# Patient Record
Sex: FEMALE | Race: WHITE | NOT HISPANIC OR LATINO | Employment: OTHER | RURAL
[De-identification: names, ages, dates, MRNs, and addresses within clinical notes are randomized per-mention and may not be internally consistent; named-entity substitution may affect disease eponyms.]

---

## 2018-02-22 ENCOUNTER — HISTORICAL (OUTPATIENT)
Dept: ADMINISTRATIVE | Facility: HOSPITAL | Age: 78
End: 2018-02-22

## 2018-02-26 LAB
LAB AP GENERAL CAT - HISTORICAL: NORMAL
LAB AP INTERPRETATION/RESULT - HISTORICAL: NEGATIVE
LAB AP SPECIMEN ADEQUACY - HISTORICAL: NORMAL
LAB AP SPECIMEN SUBMITTED - HISTORICAL: NORMAL

## 2020-06-25 ENCOUNTER — HISTORICAL (OUTPATIENT)
Dept: ADMINISTRATIVE | Facility: HOSPITAL | Age: 80
End: 2020-06-25

## 2021-05-11 ENCOUNTER — OFFICE VISIT (OUTPATIENT)
Dept: FAMILY MEDICINE | Facility: CLINIC | Age: 81
End: 2021-05-11
Payer: MEDICARE

## 2021-05-11 ENCOUNTER — APPOINTMENT (OUTPATIENT)
Dept: RADIOLOGY | Facility: CLINIC | Age: 81
End: 2021-05-11
Attending: FAMILY MEDICINE
Payer: MEDICARE

## 2021-05-11 VITALS
WEIGHT: 112 LBS | BODY MASS INDEX: 25.19 KG/M2 | SYSTOLIC BLOOD PRESSURE: 118 MMHG | TEMPERATURE: 98 F | OXYGEN SATURATION: 98 % | HEART RATE: 73 BPM | DIASTOLIC BLOOD PRESSURE: 60 MMHG | HEIGHT: 56 IN

## 2021-05-11 DIAGNOSIS — M79.642 LEFT HAND PAIN: ICD-10-CM

## 2021-05-11 DIAGNOSIS — S63.92XA HAND SPRAIN, LEFT, INITIAL ENCOUNTER: ICD-10-CM

## 2021-05-11 DIAGNOSIS — M79.642 LEFT HAND PAIN: Primary | ICD-10-CM

## 2021-05-11 PROCEDURE — 99203 PR OFFICE/OUTPT VISIT, NEW, LEVL III, 30-44 MIN: ICD-10-PCS | Mod: ,,, | Performed by: FAMILY MEDICINE

## 2021-05-11 PROCEDURE — 73130 X-RAY EXAM OF HAND: CPT | Mod: TC,RHCUB,LT | Performed by: FAMILY MEDICINE

## 2021-05-11 PROCEDURE — 73130 X-RAY EXAM OF HAND: CPT | Mod: 26,LT,, | Performed by: RADIOLOGY

## 2021-05-11 PROCEDURE — 99203 OFFICE O/P NEW LOW 30 MIN: CPT | Mod: ,,, | Performed by: FAMILY MEDICINE

## 2021-05-11 PROCEDURE — 73130 XR HAND COMPLETE 3 VIEW LEFT: ICD-10-PCS | Mod: 26,LT,, | Performed by: RADIOLOGY

## 2021-05-11 RX ORDER — DICYCLOMINE HYDROCHLORIDE 10 MG/1
CAPSULE ORAL
COMMUNITY
Start: 2021-04-01

## 2021-05-11 RX ORDER — DICLOFENAC SODIUM 10 MG/G
GEL TOPICAL
COMMUNITY
Start: 2021-04-01 | End: 2022-07-18

## 2021-05-11 RX ORDER — IPRATROPIUM BROMIDE 17 UG/1
AEROSOL, METERED RESPIRATORY (INHALATION)
COMMUNITY
Start: 2020-08-17 | End: 2022-07-11

## 2021-05-11 RX ORDER — DICLOFENAC SODIUM 10 MG/G
1 GEL TOPICAL
COMMUNITY
Start: 2020-08-10 | End: 2021-08-10

## 2021-05-11 RX ORDER — GABAPENTIN 300 MG/1
300 CAPSULE ORAL
Status: ON HOLD | COMMUNITY
Start: 2020-08-10 | End: 2022-07-18

## 2021-05-11 RX ORDER — DOCUSATE SODIUM 100 MG/1
100 CAPSULE, LIQUID FILLED ORAL
COMMUNITY
Start: 2020-08-17 | End: 2021-08-17

## 2021-05-11 RX ORDER — MELOXICAM 15 MG/1
15 TABLET ORAL
COMMUNITY
Start: 2020-08-10 | End: 2021-08-10

## 2021-05-11 RX ORDER — OMEPRAZOLE 20 MG/1
20 CAPSULE, DELAYED RELEASE ORAL
COMMUNITY
Start: 2020-08-10 | End: 2022-07-11

## 2021-05-11 RX ORDER — TIZANIDINE HYDROCHLORIDE 4 MG/1
TABLET ORAL
COMMUNITY
Start: 2021-04-01

## 2021-05-11 RX ORDER — LEVOTHYROXINE SODIUM 50 UG/1
50 TABLET ORAL
Status: ON HOLD | COMMUNITY
Start: 2020-08-10 | End: 2022-07-18

## 2021-05-11 RX ORDER — PERPHENAZINE AND AMITRIPTYLINE HYDROCHLORIDE 2; 25 MG/1; MG/1
TABLET, FILM COATED ORAL
COMMUNITY
Start: 2021-03-13 | End: 2022-07-18

## 2021-05-11 RX ORDER — ACETAMINOPHEN 325 MG/1
325 TABLET ORAL
COMMUNITY
Start: 2020-08-10 | End: 2021-08-10

## 2021-05-11 RX ORDER — PROMETHAZINE HYDROCHLORIDE 25 MG/1
25 TABLET ORAL
COMMUNITY
Start: 2020-08-10 | End: 2021-08-10

## 2021-06-29 ENCOUNTER — HOSPITAL ENCOUNTER (OUTPATIENT)
Dept: RADIOLOGY | Facility: HOSPITAL | Age: 81
Discharge: HOME | End: 2021-06-29
Payer: MEDICARE

## 2021-06-29 VITALS — BODY MASS INDEX: 23.39 KG/M2 | WEIGHT: 116 LBS | HEIGHT: 59 IN

## 2021-06-29 DIAGNOSIS — Z12.31 VISIT FOR SCREENING MAMMOGRAM: ICD-10-CM

## 2021-06-29 PROCEDURE — 77067 MAMMO DIGITAL SCREENING BILAT: ICD-10-PCS | Mod: 26,,, | Performed by: RADIOLOGY

## 2021-06-29 PROCEDURE — 77067 SCR MAMMO BI INCL CAD: CPT | Mod: 26,,, | Performed by: RADIOLOGY

## 2021-06-29 PROCEDURE — 77067 SCR MAMMO BI INCL CAD: CPT | Mod: TC

## 2021-07-19 ENCOUNTER — HOSPITAL ENCOUNTER (OUTPATIENT)
Dept: RADIOLOGY | Facility: HOSPITAL | Age: 81
Discharge: HOME | End: 2021-07-19
Attending: ORTHOPAEDIC SURGERY
Payer: MEDICARE

## 2021-07-19 DIAGNOSIS — M79.642 HAND PAIN, LEFT: ICD-10-CM

## 2021-07-19 PROBLEM — M19.042 ARTHRITIS OF LEFT HAND: Status: ACTIVE | Noted: 2021-07-19

## 2021-07-19 PROCEDURE — 73130 X-RAY EXAM OF HAND: CPT | Mod: TC,LT

## 2022-07-11 ENCOUNTER — OFFICE VISIT (OUTPATIENT)
Dept: FAMILY MEDICINE | Facility: CLINIC | Age: 82
End: 2022-07-11
Payer: MEDICARE

## 2022-07-11 ENCOUNTER — APPOINTMENT (OUTPATIENT)
Dept: RADIOLOGY | Facility: CLINIC | Age: 82
End: 2022-07-11
Attending: NURSE PRACTITIONER
Payer: MEDICARE

## 2022-07-11 VITALS
RESPIRATION RATE: 16 BRPM | BODY MASS INDEX: 23.39 KG/M2 | HEART RATE: 66 BPM | DIASTOLIC BLOOD PRESSURE: 58 MMHG | OXYGEN SATURATION: 97 % | TEMPERATURE: 99 F | WEIGHT: 116 LBS | HEIGHT: 59 IN | SYSTOLIC BLOOD PRESSURE: 112 MMHG
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
## 2022-07-11 DIAGNOSIS — M25.551 RIGHT HIP PAIN: ICD-10-CM

## 2022-07-11 DIAGNOSIS — M25.551 RIGHT HIP PAIN: Primary | ICD-10-CM

## 2022-07-11 PROCEDURE — 96372 PR INJECTION,THERAP/PROPH/DIAG2ST, IM OR SUBCUT: ICD-10-PCS | Mod: ,,, | Performed by: NURSE PRACTITIONER

## 2022-07-11 PROCEDURE — 99213 OFFICE O/P EST LOW 20 MIN: CPT | Mod: ,,, | Performed by: NURSE PRACTITIONER

## 2022-07-11 PROCEDURE — 73502 XR PELVIS 3 VIEW INC HIP 2 VIEW RIGHT: ICD-10-PCS | Mod: 26,RT,, | Performed by: STUDENT IN AN ORGANIZED HEALTH CARE EDUCATION/TRAINING PROGRAM

## 2022-07-11 PROCEDURE — 96372 THER/PROPH/DIAG INJ SC/IM: CPT | Mod: ,,, | Performed by: NURSE PRACTITIONER

## 2022-07-11 PROCEDURE — 73502 X-RAY EXAM HIP UNI 2-3 VIEWS: CPT | Mod: 26,RT,, | Performed by: STUDENT IN AN ORGANIZED HEALTH CARE EDUCATION/TRAINING PROGRAM

## 2022-07-11 PROCEDURE — 73502 X-RAY EXAM HIP UNI 2-3 VIEWS: CPT | Mod: TC,RHCUB,RT | Performed by: NURSE PRACTITIONER

## 2022-07-11 PROCEDURE — 99213 PR OFFICE/OUTPT VISIT, EST, LEVL III, 20-29 MIN: ICD-10-PCS | Mod: ,,, | Performed by: NURSE PRACTITIONER

## 2022-07-11 RX ORDER — ACETAMINOPHEN 325 MG/1
TABLET ORAL
COMMUNITY
Start: 2022-05-03

## 2022-07-11 RX ORDER — DOCUSATE SODIUM 100 MG/1
CAPSULE, LIQUID FILLED ORAL
COMMUNITY
Start: 2022-01-26

## 2022-07-11 RX ORDER — MULTIVITAMIN
1 TABLET ORAL DAILY PRN
COMMUNITY
Start: 2022-02-01

## 2022-07-11 RX ORDER — MULTIVITAMIN
1 TABLET ORAL DAILY
COMMUNITY
Start: 2022-05-03

## 2022-07-11 RX ORDER — ASPIRIN 81 MG/1
81 TABLET ORAL DAILY
COMMUNITY
Start: 2022-05-03 | End: 2022-07-18

## 2022-07-11 RX ORDER — MELOXICAM 15 MG/1
15 TABLET ORAL DAILY
Status: ON HOLD | COMMUNITY
Start: 2022-05-03 | End: 2022-07-18 | Stop reason: HOSPADM

## 2022-07-11 RX ORDER — KETOROLAC TROMETHAMINE 30 MG/ML
30 INJECTION, SOLUTION INTRAMUSCULAR; INTRAVENOUS
Status: COMPLETED | OUTPATIENT
Start: 2022-07-11 | End: 2022-07-11

## 2022-07-11 RX ORDER — PROMETHAZINE HYDROCHLORIDE 25 MG/1
TABLET ORAL
COMMUNITY
Start: 2022-05-03

## 2022-07-11 RX ADMIN — KETOROLAC TROMETHAMINE 30 MG: 30 INJECTION, SOLUTION INTRAMUSCULAR; INTRAVENOUS at 04:07

## 2022-07-11 NOTE — PATIENT INSTRUCTIONS
-Continue current medications as prescribed.  -Ice to right hip as needed for pain.  -Follow up with PCP if pain does not improve or worsens.

## 2022-07-11 NOTE — PROGRESS NOTES
[facility] Medicine    Chief Complaint      Chief Complaint   Patient presents with    Hip Pain     Right hip; fell Saturday evening at home. Pt states painful to walk     History of Present Illness      Anibal Fermin is a 82 y.o. female with chronic conditions of arthritis who presents today for c/o right hip pain after falling at home 2 days ago.    Hip Pain   The incident occurred 3 to 5 days ago. The incident occurred at home. The injury mechanism was a fall. The pain is present in the right hip and right thigh. The pain is moderate. The pain has been constant since onset. Associated symptoms include an inability to bear weight and a loss of motion. Pertinent negatives include no loss of sensation, muscle weakness, numbness or tingling. She reports no foreign bodies present. The symptoms are aggravated by weight bearing and movement. She has tried non-weight bearing for the symptoms. The treatment provided mild relief.     Past Medical History:  No past medical history on file.    Past Surgical History:   has a past surgical history that includes Hysterectomy.    Social History:  Social History     Tobacco Use    Smoking status: Never Smoker    Smokeless tobacco: Never Used   Substance Use Topics    Alcohol use: Never    Drug use: Never     I personally reviewed all past medical, surgical, and social.     Review of Systems   Constitutional: Negative for chills, fever and malaise/fatigue.   HENT: Negative for congestion, ear pain, hearing loss, sore throat and tinnitus.    Eyes: Negative for blurred vision and double vision.   Respiratory: Negative for cough and shortness of breath.    Cardiovascular: Negative for chest pain, palpitations and leg swelling.   Gastrointestinal: Negative for abdominal pain, nausea and vomiting.   Genitourinary: Negative for dysuria, frequency and urgency.   Musculoskeletal: Positive for falls and joint pain (right hip). Negative for myalgias.   Skin: Negative for itching and  "rash.   Neurological: Negative for dizziness, tingling, weakness, numbness and headaches.   Endo/Heme/Allergies: Does not bruise/bleed easily.   Psychiatric/Behavioral: Negative for suicidal ideas.      Medications:  Outpatient Encounter Medications as of 7/11/2022   Medication Sig Dispense Refill    acetaminophen (TYLENOL) 325 MG tablet Take by mouth.      aspirin (ECOTRIN) 81 MG EC tablet Take 81 mg by mouth once daily.      calcium-vitamin D 600 mg-10 mcg (400 unit) Tab Take 1 tablet by mouth daily as needed.      dicyclomine (BENTYL) 10 MG capsule       docusate sodium (COLACE) 100 MG capsule Take by mouth.      gabapentin (NEURONTIN) 300 MG capsule Take 300 mg by mouth.      ipratropium (ATROVENT HFA) 17 mcg/actuation inhaler Inhale into the lungs.      levothyroxine (SYNTHROID) 50 MCG tablet Take 50 mcg by mouth.      meloxicam (MOBIC) 15 MG tablet Take 15 mg by mouth once daily.      multivitamin (THERAGRAN) per tablet Take 1 tablet by mouth once daily.      omeprazole (PRILOSEC) 20 MG capsule Take 20 mg by mouth.      perphenazine-amitriptyline 2-25 mg 2-25 mg Tab       promethazine (PHENERGAN) 25 MG tablet Take by mouth.      VOLTAREN 1 % Gel       ZANAFLEX 4 mg tablet        Facility-Administered Encounter Medications as of 7/11/2022   Medication Dose Route Frequency Provider Last Rate Last Admin    ketorolac injection 30 mg  30 mg Intramuscular 1 time in Clinic/HOD QUAN [place]         Allergies:  Review of patient's allergies indicates:  No Known Allergies    Health Maintenance:    There is no immunization history on file for this patient.   Health Maintenance   Topic Date Due    Lipid Panel  Never done    TETANUS VACCINE  Never done    DEXA Scan  Never done      Physical Exam      Vital Signs  Temp: 98.5 °F (36.9 °C)  Pulse: 66  Resp: 16  SpO2: 97 %  BP: (!) 112/58  Pain Score:   6  Height and Weight  Height: 4' 11" (149.9 cm)  Weight: 52.6 kg (116 lb)  BSA (Calculated - sq " m): 1.48 sq meters  BMI (Calculated): 23.4  Weight in (lb) to have BMI = 25: 123.5]    Physical Exam  Vitals and nursing note reviewed.   Constitutional:       General: She is not in acute distress.     Appearance: Normal appearance.   HENT:      Head: Normocephalic and atraumatic.      Right Ear: External ear normal.      Left Ear: External ear normal.      Nose: Nose normal.      Mouth/Throat:      Mouth: Mucous membranes are moist.      Pharynx: Oropharynx is clear.   Eyes:      Conjunctiva/sclera: Conjunctivae normal.      Pupils: Pupils are equal, round, and reactive to light.   Cardiovascular:      Rate and Rhythm: Normal rate and regular rhythm.      Pulses: Normal pulses.      Heart sounds: Normal heart sounds. No murmur heard.  Pulmonary:      Effort: Pulmonary effort is normal. No respiratory distress.      Breath sounds: Normal breath sounds.   Musculoskeletal:      Cervical back: Normal range of motion and neck supple.      Right hip: Tenderness present. Decreased range of motion.   Skin:     General: Skin is warm and dry.   Neurological:      General: No focal deficit present.      Mental Status: She is alert and oriented to person, place, and time. Mental status is at baseline.   Psychiatric:         Mood and Affect: Mood normal.         Behavior: Behavior normal.         Thought Content: Thought content normal.         Judgment: Judgment normal.        Assessment/Plan     Anibal Fermin is a 82 y.o.female with:    1. Right hip pain  - X-Ray Pelvis 3 view inc Hip 2 view Right; Future  - ketorolac injection 30 mg    2. Fall, initial encounter  - X-Ray Pelvis 3 view inc Hip 2 view Right; Future     Chronic conditions status updated as per HPI.  Other than changes above, cont current medications and maintain follow up with specialists.  Return to clinic as needed.    Hilary Cunningham, KATYAP  [facility]

## 2022-07-15 ENCOUNTER — HOSPITAL ENCOUNTER (INPATIENT)
Facility: HOSPITAL | Age: 82
LOS: 3 days | Discharge: NURSING, REHAB OR LONG-TERM CARE | DRG: 482 | End: 2022-07-18
Attending: FAMILY MEDICINE | Admitting: FAMILY MEDICINE
Payer: MEDICARE

## 2022-07-15 DIAGNOSIS — M19.042 ARTHRITIS OF LEFT HAND: ICD-10-CM

## 2022-07-15 DIAGNOSIS — R07.9 CHEST PAIN: ICD-10-CM

## 2022-07-15 DIAGNOSIS — Z01.818 PREOP EXAMINATION: Primary | ICD-10-CM

## 2022-07-15 DIAGNOSIS — E03.9 HYPOTHYROIDISM, UNSPECIFIED TYPE: ICD-10-CM

## 2022-07-15 DIAGNOSIS — M19.90 OSTEOARTHRITIS, UNSPECIFIED OSTEOARTHRITIS TYPE, UNSPECIFIED SITE: ICD-10-CM

## 2022-07-15 DIAGNOSIS — S72.91XA CLOSED FRACTURE OF RIGHT FEMUR, UNSPECIFIED FRACTURE MORPHOLOGY, UNSPECIFIED PORTION OF FEMUR, INITIAL ENCOUNTER: ICD-10-CM

## 2022-07-15 DIAGNOSIS — G62.9 NEUROPATHY: ICD-10-CM

## 2022-07-15 DIAGNOSIS — S72.001A CLOSED FRACTURE OF NECK OF RIGHT FEMUR, INITIAL ENCOUNTER: ICD-10-CM

## 2022-07-15 DIAGNOSIS — K21.9 GASTROESOPHAGEAL REFLUX DISEASE, UNSPECIFIED WHETHER ESOPHAGITIS PRESENT: ICD-10-CM

## 2022-07-15 PROBLEM — S72.90XA FRACTURE OF FEMUR: Status: ACTIVE | Noted: 2022-07-15

## 2022-07-15 LAB
ANION GAP SERPL CALCULATED.3IONS-SCNC: 10 MMOL/L (ref 7–16)
APTT PPP: 30.2 SECONDS (ref 25.2–37.3)
BASOPHILS # BLD AUTO: 0.02 K/UL (ref 0–0.2)
BASOPHILS NFR BLD AUTO: 0.3 % (ref 0–1)
BUN SERPL-MCNC: 21 MG/DL (ref 7–18)
BUN/CREAT SERPL: 21 (ref 6–20)
CALCIUM SERPL-MCNC: 8.6 MG/DL (ref 8.5–10.1)
CHLORIDE SERPL-SCNC: 105 MMOL/L (ref 98–107)
CO2 SERPL-SCNC: 28 MMOL/L (ref 21–32)
CREAT SERPL-MCNC: 1.01 MG/DL (ref 0.55–1.02)
DIFFERENTIAL METHOD BLD: ABNORMAL
EOSINOPHIL # BLD AUTO: 0.13 K/UL (ref 0–0.5)
EOSINOPHIL NFR BLD AUTO: 1.9 % (ref 1–4)
ERYTHROCYTE [DISTWIDTH] IN BLOOD BY AUTOMATED COUNT: 13.4 % (ref 11.5–14.5)
FLUAV AG UPPER RESP QL IA.RAPID: NEGATIVE
FLUBV AG UPPER RESP QL IA.RAPID: NEGATIVE
GLUCOSE SERPL-MCNC: 101 MG/DL (ref 74–106)
HCT VFR BLD AUTO: 36.3 % (ref 38–47)
HGB BLD-MCNC: 11.9 G/DL (ref 12–16)
IMM GRANULOCYTES # BLD AUTO: 0.01 K/UL (ref 0–0.04)
IMM GRANULOCYTES NFR BLD: 0.1 % (ref 0–0.4)
INDIRECT COOMBS: NORMAL
INR BLD: 1.01
LYMPHOCYTES # BLD AUTO: 1.31 K/UL (ref 1–4.8)
LYMPHOCYTES NFR BLD AUTO: 19 % (ref 27–41)
MCH RBC QN AUTO: 29.7 PG (ref 27–31)
MCHC RBC AUTO-ENTMCNC: 32.8 G/DL (ref 32–36)
MCV RBC AUTO: 90.5 FL (ref 80–96)
MONOCYTES # BLD AUTO: 0.45 K/UL (ref 0–0.8)
MONOCYTES NFR BLD AUTO: 6.5 % (ref 2–6)
MPC BLD CALC-MCNC: 8.4 FL (ref 9.4–12.4)
NEUTROPHILS # BLD AUTO: 4.96 K/UL (ref 1.8–7.7)
NEUTROPHILS NFR BLD AUTO: 72.2 % (ref 53–65)
NRBC # BLD AUTO: 0 X10E3/UL
NRBC, AUTO (.00): 0 %
PLATELET # BLD AUTO: 289 K/UL (ref 150–400)
POTASSIUM SERPL-SCNC: 4.1 MMOL/L (ref 3.5–5.1)
PROTHROMBIN TIME: 12.9 SECONDS (ref 11.7–14.7)
RBC # BLD AUTO: 4.01 M/UL (ref 4.2–5.4)
RH BLD: NORMAL
SARS-COV+SARS-COV-2 AG RESP QL IA.RAPID: NEGATIVE
SODIUM SERPL-SCNC: 139 MMOL/L (ref 136–145)
WBC # BLD AUTO: 6.88 K/UL (ref 4.5–11)

## 2022-07-15 PROCEDURE — 99223 1ST HOSP IP/OBS HIGH 75: CPT | Mod: AI,,, | Performed by: FAMILY MEDICINE

## 2022-07-15 PROCEDURE — 36415 COLL VENOUS BLD VENIPUNCTURE: CPT | Performed by: NURSE PRACTITIONER

## 2022-07-15 PROCEDURE — 25000003 PHARM REV CODE 250: Performed by: HOSPITALIST

## 2022-07-15 PROCEDURE — 25000003 PHARM REV CODE 250: Performed by: FAMILY MEDICINE

## 2022-07-15 PROCEDURE — 25000003 PHARM REV CODE 250: Performed by: NURSE PRACTITIONER

## 2022-07-15 PROCEDURE — 63600175 PHARM REV CODE 636 W HCPCS: Performed by: NURSE PRACTITIONER

## 2022-07-15 PROCEDURE — 99285 PR EMERGENCY DEPT VISIT,LEVEL V: ICD-10-PCS | Mod: ,,, | Performed by: NURSE PRACTITIONER

## 2022-07-15 PROCEDURE — 96375 TX/PRO/DX INJ NEW DRUG ADDON: CPT

## 2022-07-15 PROCEDURE — 99285 EMERGENCY DEPT VISIT HI MDM: CPT | Mod: 25

## 2022-07-15 PROCEDURE — 99285 EMERGENCY DEPT VISIT HI MDM: CPT | Mod: ,,, | Performed by: NURSE PRACTITIONER

## 2022-07-15 PROCEDURE — 96374 THER/PROPH/DIAG INJ IV PUSH: CPT

## 2022-07-15 PROCEDURE — 86901 BLOOD TYPING SEROLOGIC RH(D): CPT | Performed by: NURSE PRACTITIONER

## 2022-07-15 PROCEDURE — 11000001 HC ACUTE MED/SURG PRIVATE ROOM

## 2022-07-15 PROCEDURE — 99223 PR INITIAL HOSPITAL CARE,LEVL III: ICD-10-PCS | Mod: AI,,, | Performed by: FAMILY MEDICINE

## 2022-07-15 PROCEDURE — 80048 BASIC METABOLIC PNL TOTAL CA: CPT | Performed by: NURSE PRACTITIONER

## 2022-07-15 PROCEDURE — 51702 INSERT TEMP BLADDER CATH: CPT

## 2022-07-15 PROCEDURE — 85025 COMPLETE CBC W/AUTO DIFF WBC: CPT | Performed by: NURSE PRACTITIONER

## 2022-07-15 PROCEDURE — 85610 PROTHROMBIN TIME: CPT | Performed by: NURSE PRACTITIONER

## 2022-07-15 PROCEDURE — 87428 SARSCOV & INF VIR A&B AG IA: CPT | Performed by: FAMILY MEDICINE

## 2022-07-15 PROCEDURE — 85730 THROMBOPLASTIN TIME PARTIAL: CPT | Performed by: NURSE PRACTITIONER

## 2022-07-15 RX ORDER — ACETAMINOPHEN 325 MG/1
650 TABLET ORAL EVERY 4 HOURS PRN
Status: DISCONTINUED | OUTPATIENT
Start: 2022-07-15 | End: 2022-07-18 | Stop reason: HOSPADM

## 2022-07-15 RX ORDER — MORPHINE SULFATE 4 MG/ML
4 INJECTION, SOLUTION INTRAMUSCULAR; INTRAVENOUS EVERY 4 HOURS PRN
Status: DISCONTINUED | OUTPATIENT
Start: 2022-07-15 | End: 2022-07-18 | Stop reason: HOSPADM

## 2022-07-15 RX ORDER — ONDANSETRON 2 MG/ML
4 INJECTION INTRAMUSCULAR; INTRAVENOUS
Status: COMPLETED | OUTPATIENT
Start: 2022-07-15 | End: 2022-07-15

## 2022-07-15 RX ORDER — POLYETHYLENE GLYCOL 3350 17 G/17G
17 POWDER, FOR SOLUTION ORAL DAILY
Status: DISCONTINUED | OUTPATIENT
Start: 2022-07-16 | End: 2022-07-18 | Stop reason: HOSPADM

## 2022-07-15 RX ORDER — DEXTROSE MONOHYDRATE AND SODIUM CHLORIDE 5; .9 G/100ML; G/100ML
INJECTION, SOLUTION INTRAVENOUS CONTINUOUS
Status: DISCONTINUED | OUTPATIENT
Start: 2022-07-15 | End: 2022-07-18 | Stop reason: HOSPADM

## 2022-07-15 RX ORDER — MORPHINE SULFATE 4 MG/ML
4 INJECTION, SOLUTION INTRAMUSCULAR; INTRAVENOUS
Status: COMPLETED | OUTPATIENT
Start: 2022-07-15 | End: 2022-07-15

## 2022-07-15 RX ORDER — DOCUSATE SODIUM 100 MG/1
100 CAPSULE, LIQUID FILLED ORAL DAILY
Status: DISCONTINUED | OUTPATIENT
Start: 2022-07-16 | End: 2022-07-18 | Stop reason: HOSPADM

## 2022-07-15 RX ORDER — NALOXONE HCL 0.4 MG/ML
0.02 VIAL (ML) INJECTION
Status: DISCONTINUED | OUTPATIENT
Start: 2022-07-15 | End: 2022-07-18 | Stop reason: HOSPADM

## 2022-07-15 RX ORDER — SODIUM CHLORIDE 0.9 % (FLUSH) 0.9 %
10 SYRINGE (ML) INJECTION EVERY 12 HOURS PRN
Status: DISCONTINUED | OUTPATIENT
Start: 2022-07-15 | End: 2022-07-18 | Stop reason: HOSPADM

## 2022-07-15 RX ORDER — MAG HYDROX/ALUMINUM HYD/SIMETH 200-200-20
30 SUSPENSION, ORAL (FINAL DOSE FORM) ORAL 4 TIMES DAILY PRN
Status: DISCONTINUED | OUTPATIENT
Start: 2022-07-15 | End: 2022-07-18 | Stop reason: HOSPADM

## 2022-07-15 RX ORDER — ONDANSETRON 2 MG/ML
4 INJECTION INTRAMUSCULAR; INTRAVENOUS EVERY 8 HOURS PRN
Status: DISCONTINUED | OUTPATIENT
Start: 2022-07-15 | End: 2022-07-18 | Stop reason: HOSPADM

## 2022-07-15 RX ORDER — MUPIROCIN 20 MG/G
OINTMENT TOPICAL 2 TIMES DAILY
Status: DISCONTINUED | OUTPATIENT
Start: 2022-07-15 | End: 2022-07-18 | Stop reason: HOSPADM

## 2022-07-15 RX ORDER — TALC
6 POWDER (GRAM) TOPICAL NIGHTLY PRN
Status: DISCONTINUED | OUTPATIENT
Start: 2022-07-15 | End: 2022-07-18 | Stop reason: HOSPADM

## 2022-07-15 RX ORDER — GABAPENTIN 300 MG/1
300 CAPSULE ORAL 2 TIMES DAILY
Status: DISCONTINUED | OUTPATIENT
Start: 2022-07-15 | End: 2022-07-18 | Stop reason: HOSPADM

## 2022-07-15 RX ORDER — HYDROCODONE BITARTRATE AND ACETAMINOPHEN 5; 325 MG/1; MG/1
1 TABLET ORAL EVERY 6 HOURS PRN
Status: DISCONTINUED | OUTPATIENT
Start: 2022-07-15 | End: 2022-07-18 | Stop reason: HOSPADM

## 2022-07-15 RX ORDER — TIZANIDINE 4 MG/1
4 TABLET ORAL EVERY 8 HOURS PRN
Status: DISCONTINUED | OUTPATIENT
Start: 2022-07-15 | End: 2022-07-18 | Stop reason: HOSPADM

## 2022-07-15 RX ORDER — PANTOPRAZOLE SODIUM 40 MG/1
40 TABLET, DELAYED RELEASE ORAL DAILY
Status: DISCONTINUED | OUTPATIENT
Start: 2022-07-16 | End: 2022-07-18 | Stop reason: HOSPADM

## 2022-07-15 RX ORDER — GLUCAGON 1 MG
1 KIT INJECTION
Status: DISCONTINUED | OUTPATIENT
Start: 2022-07-15 | End: 2022-07-18 | Stop reason: HOSPADM

## 2022-07-15 RX ORDER — LEVOTHYROXINE SODIUM 50 UG/1
50 TABLET ORAL
Status: DISCONTINUED | OUTPATIENT
Start: 2022-07-16 | End: 2022-07-18 | Stop reason: HOSPADM

## 2022-07-15 RX ORDER — SIMETHICONE 80 MG
1 TABLET,CHEWABLE ORAL 4 TIMES DAILY PRN
Status: DISCONTINUED | OUTPATIENT
Start: 2022-07-15 | End: 2022-07-18 | Stop reason: HOSPADM

## 2022-07-15 RX ADMIN — ONDANSETRON 4 MG: 2 INJECTION INTRAMUSCULAR; INTRAVENOUS at 04:07

## 2022-07-15 RX ADMIN — MUPIROCIN: 20 OINTMENT TOPICAL at 10:07

## 2022-07-15 RX ADMIN — ONDANSETRON 4 MG: 2 INJECTION INTRAMUSCULAR; INTRAVENOUS at 10:07

## 2022-07-15 RX ADMIN — HYDROCODONE BITARTRATE AND ACETAMINOPHEN 1 TABLET: 5; 325 TABLET ORAL at 10:07

## 2022-07-15 RX ADMIN — DEXTROSE AND SODIUM CHLORIDE: 5; 900 INJECTION, SOLUTION INTRAVENOUS at 06:07

## 2022-07-15 RX ADMIN — TIZANIDINE 4 MG: 4 TABLET ORAL at 10:07

## 2022-07-15 RX ADMIN — GABAPENTIN 300 MG: 300 CAPSULE ORAL at 10:07

## 2022-07-15 RX ADMIN — MORPHINE SULFATE 4 MG: 4 INJECTION INTRAVENOUS at 04:07

## 2022-07-15 RX ADMIN — SIMETHICONE 80 MG: 80 TABLET, CHEWABLE ORAL at 10:07

## 2022-07-15 RX ADMIN — MELATONIN 6 MG: at 10:07

## 2022-07-15 NOTE — PROVIDER PROGRESS NOTES - EMERGENCY DEPT.
Encounter Date: 7/15/2022    ED Physician Progress Notes        Discussed with ; will admit to medicine

## 2022-07-15 NOTE — ASSESSMENT & PLAN NOTE
- acute  - comminuted impacted fracture of the right-sided femoral neck  - Dimitry consulted   - NPO p MN   - D5NS IVF  - EKG and CXR without acute processes   - continue home med- holding ASA

## 2022-07-15 NOTE — ED PROVIDER NOTES
Encounter Date: 7/15/2022       History     Chief Complaint   Patient presents with    Hip Pain     82 year old female presents to the emergency department to be evaluated for right hip pain. She fell and injured her right hip 1 week ago. She had an xray right of her right hip 4 days ago that showed no acute abnormality. She has continued to have right hip pain that has gradually gotten worse since she fell a week ago.    The history is provided by the patient.   Hip Pain  This is a new problem. Pertinent negatives include no chest pain, no abdominal pain, no headaches and no shortness of breath.     Review of patient's allergies indicates:  No Known Allergies  History reviewed. No pertinent past medical history.  Past Surgical History:   Procedure Laterality Date    HYSTERECTOMY       History reviewed. No pertinent family history.  Social History     Tobacco Use    Smoking status: Never Smoker    Smokeless tobacco: Never Used   Substance Use Topics    Alcohol use: Never    Drug use: Never     Review of Systems   Respiratory: Negative for shortness of breath.    Cardiovascular: Negative for chest pain.   Gastrointestinal: Negative for abdominal pain.   Neurological: Negative for headaches.   All other systems reviewed and are negative.      Physical Exam     Initial Vitals [07/15/22 1415]   BP Pulse Resp Temp SpO2   (!) 149/50 70 18 97.8 °F (36.6 °C) 98 %      MAP       --         Physical Exam    Vitals reviewed.  Constitutional: She appears well-developed and well-nourished.   HENT:   Head: Normocephalic and atraumatic.   Neck: Neck supple.   Cardiovascular: Normal rate and regular rhythm.   Pulmonary/Chest: Breath sounds normal.   Abdominal: Abdomen is soft. Bowel sounds are normal. She exhibits no distension and no mass. There is no abdominal tenderness. There is no rebound and no guarding.   Musculoskeletal:      Cervical back: Normal and neck supple.      Thoracic back: Normal.      Lumbar back: Normal.       Right hip: Tenderness and bony tenderness present. No deformity, lacerations or crepitus. Decreased range of motion. Normal strength.      Right knee: Normal.      Left knee: Normal.      Right lower leg: Normal.      Left lower leg: Normal.     Neurological: She is alert and oriented to person, place, and time. She has normal strength. GCS score is 15. GCS eye subscore is 4. GCS verbal subscore is 5. GCS motor subscore is 6.   Skin: Skin is warm and dry. Capillary refill takes less than 2 seconds.   Psychiatric: She has a normal mood and affect.         Medical Screening Exam   See Full Note    ED Course   Procedures  Labs Reviewed   CBC W/ AUTO DIFFERENTIAL    Narrative:     The following orders were created for panel order CBC auto differential.  Procedure                               Abnormality         Status                     ---------                               -----------         ------                     CBC with Differential[[id number]]                                                         Please view results for these tests on the individual orders.   BASIC METABOLIC PANEL   PROTIME-INR   APTT   CBC WITH DIFFERENTIAL   TYPE & SCREEN          Imaging Results          X-Ray Chest 1 View (In process)                CT Hip Without Contrast Right (In process)                  Medications - No data to display                    Clinical Impression:   Final diagnoses:  [Z01.818] Preop examination  [Z01.818] Preop examination                 Cathleen Dodson, ROWENA  07/25/22 9213

## 2022-07-15 NOTE — ED TRIAGE NOTES
Right hip pain after fall last Saturday, pt reports being seen in clinic on Monday and being told there was no fracture. Pt reports having Dr Sequeira appt on Wednesday.

## 2022-07-15 NOTE — HPI
Ms Fermin is an 81 yo female who presented to the ED with c/o R leg/hip pain that started after a fall a week ago. She was seen at an outside clinic and had imaging done that did not show a fracture. She was sent home. She has continued to have problems walking and continued pain. Imaging in the ED today revealed a comminuted impacted fracture of the right-sided femoral neck. She is being admitted to the hospitalist service with consult to orthopedics.     She has a PMH of hypothyroidism, osteoarthritis, neuropathy, and COPD. She denies any ETOH or smoking. She lives at home. She has not been vaccinated for covid; she has not had covid. She is considered a full code.

## 2022-07-15 NOTE — SUBJECTIVE & OBJECTIVE
Past Medical History:   Diagnosis Date    Arthritis     COPD (chronic obstructive pulmonary disease)     Digestive disorder     Thyroid disease        Past Surgical History:   Procedure Laterality Date    ABDOMINAL SURGERY      HYSTERECTOMY         Review of patient's allergies indicates:  No Known Allergies    No current facility-administered medications on file prior to encounter.     Current Outpatient Medications on File Prior to Encounter   Medication Sig    acetaminophen (TYLENOL) 325 MG tablet Take by mouth.    aspirin (ECOTRIN) 81 MG EC tablet Take 81 mg by mouth once daily.    calcium-vitamin D 600 mg-10 mcg (400 unit) Tab Take 1 tablet by mouth daily as needed.    dicyclomine (BENTYL) 10 MG capsule     docusate sodium (COLACE) 100 MG capsule Take by mouth.    gabapentin (NEURONTIN) 300 MG capsule Take 300 mg by mouth.    ipratropium (ATROVENT HFA) 17 mcg/actuation inhaler Inhale into the lungs.    levothyroxine (SYNTHROID) 50 MCG tablet Take 50 mcg by mouth.    meloxicam (MOBIC) 15 MG tablet Take 15 mg by mouth once daily.    multivitamin (THERAGRAN) per tablet Take 1 tablet by mouth once daily.    omeprazole (PRILOSEC) 20 MG capsule Take 20 mg by mouth.    perphenazine-amitriptyline 2-25 mg 2-25 mg Tab     promethazine (PHENERGAN) 25 MG tablet Take by mouth.    VOLTAREN 1 % Gel     ZANAFLEX 4 mg tablet      Family History    None       Tobacco Use    Smoking status: Never Smoker    Smokeless tobacco: Never Used   Substance and Sexual Activity    Alcohol use: Never    Drug use: Never    Sexual activity: Not on file     Review of Systems   Constitutional:  Positive for activity change. Negative for chills and fever.   HENT:  Negative for congestion and postnasal drip.    Eyes:  Negative for photophobia and visual disturbance.   Respiratory:  Negative for cough and shortness of breath.    Cardiovascular:  Negative for chest pain.   Gastrointestinal:  Negative for abdominal distention, diarrhea and nausea.    Endocrine: Negative for cold intolerance and heat intolerance.   Genitourinary:  Negative for difficulty urinating and dysuria.   Musculoskeletal:  Positive for gait problem and myalgias.   Skin:  Negative for color change and pallor.   Allergic/Immunologic: Negative for environmental allergies and food allergies.   Neurological:  Negative for dizziness, weakness and numbness.   Psychiatric/Behavioral:  Negative for agitation. The patient is not nervous/anxious.    All other systems reviewed and are negative.  Objective:     Vital Signs (Most Recent):  Temp: 97.8 °F (36.6 °C) (07/15/22 1415)  Pulse: 70 (07/15/22 1415)  Resp: 16 (07/15/22 1643)  BP: (!) 149/50 (07/15/22 1415)  SpO2: 98 % (07/15/22 1415)   Vital Signs (24h Range):  Temp:  [97.8 °F (36.6 °C)] 97.8 °F (36.6 °C)  Pulse:  [70] 70  Resp:  [16-18] 16  SpO2:  [98 %] 98 %  BP: (149)/(50) 149/50        Body mass index is 23.43 kg/m².    Physical Exam  Vitals and nursing note reviewed.   Constitutional:       Appearance: Normal appearance.   HENT:      Head: Normocephalic.      Mouth/Throat:      Mouth: Mucous membranes are moist.   Eyes:      Pupils: Pupils are equal, round, and reactive to light.   Cardiovascular:      Rate and Rhythm: Normal rate and regular rhythm.      Pulses: Normal pulses.      Heart sounds: Normal heart sounds.   Pulmonary:      Effort: Pulmonary effort is normal.      Breath sounds: Normal breath sounds.   Abdominal:      General: Bowel sounds are normal. There is no distension.      Palpations: Abdomen is soft.      Tenderness: There is no abdominal tenderness.   Musculoskeletal:         General: Normal range of motion.      Cervical back: Neck supple.        Legs:       Comments: R hipwith tenderness      Skin:     General: Skin is warm and dry.      Capillary Refill: Capillary refill takes less than 2 seconds.   Neurological:      Mental Status: She is alert and oriented to person, place, and time. Mental status is at baseline.       GCS: GCS eye subscore is 4. GCS verbal subscore is 5. GCS motor subscore is 6.      Sensory: Sensation is intact.      Motor: Motor function is intact.   Psychiatric:         Mood and Affect: Mood normal.         Behavior: Behavior normal.         CRANIAL NERVES     CN III, IV, VI   Pupils are equal, round, and reactive to light.     Significant Labs: All pertinent labs within the past 24 hours have been reviewed.  Recent Lab Results         07/15/22  1606   07/15/22  1600        Anion Gap   10       aPTT   30.2       Baso #   0.02       Basophil %   0.3       BUN   21       BUN/CREAT RATIO   21       Calcium   8.6       Chloride   105       CO2   28       Creatinine   1.01       Differential Type   Auto       eGFR if non    56       Eos #   0.13       Eosinophil %   1.9       Glucose   101       Group & Rh O POS         Hematocrit   36.3       Hemoglobin   11.9       Immature Grans (Abs)   0.01       Immature Granulocytes   0.1       INDIRECT MERYL NEG         INR   1.01       Lymph #   1.31       Lymph %   19.0       MCH   29.7       MCHC   32.8       MCV   90.5       Mono #   0.45       Mono %   6.5       MPV   8.4       Neutrophils, Abs   4.96       Neutrophils Relative   72.2       nRBC   0.0       NUCLEATED RBC ABSOLUTE   0.00       Platelets   289       Potassium   4.1       Protime   12.9       RBC   4.01       RDW   13.4       Sodium   139       WBC   6.88               Significant Imaging: I have reviewed all pertinent imaging results/findings within the past 24 hours.  Imaging Results              X-Ray Chest 1 View (Final result)  Result time 07/15/22 16:00:33      Final result by Ayo Kasper II, MD (07/15/22 16:00:33)                   Impression:      Chronic lung changes.  No acute process or significant change.      Electronically signed by: Ayo Kasper  Date:    07/15/2022  Time:    16:00               Narrative:    EXAMINATION:  XR CHEST 1 VIEW    CLINICAL  HISTORY:  Encounter for other preprocedural examination    COMPARISON:  29 May 2015    FINDINGS:  The heart and mediastinum are normal in size and configuration.  The pulmonary vascularity is normal in caliber. Lung volumes are increased with prominent bronchial markings.  No lung infiltrates, effusions, pneumothorax or other abnormality is demonstrated.                                       CT Hip Without Contrast Right (Final result)  Result time 07/15/22 15:53:56      Final result by Krishan Caballero DO (07/15/22 15:53:56)                   Impression:      Acute mildly comminuted impacted fracture of the right-sided femoral neck. No joint dislocation.      Electronically signed by: Krishan Caballero  Date:    07/15/2022  Time:    15:53               Narrative:    EXAMINATION:  CT HIP WITHOUT CONTRAST RIGHT    CLINICAL HISTORY:  hip pain;    TECHNIQUE:  CT HIP WITHOUT CONTRAST RIGHT    COMPARISON:  07/11/2022    FINDINGS:  Acute mildly comminuted impacted fracture of the right-sided femoral neck.  No joint dislocation.    No hematoma.  No fluid collection.    The visualized pelvis is normal without hematoma or retroperitoneal blood products.

## 2022-07-15 NOTE — H&P
[facility] - Emergency Department  Hospital Medicine  History & Physical    Patient Name: Anibal Fermin  MRN: [id number]  Patient Class: IP- Inpatient  Admission Date: 7/15/2022  Attending Physician: Toribio Baker MD   Primary Care Provider: Brandan Brandon DO (Inactive)         Patient information was obtained from patient, past medical records and ER records.     Subjective:     Principal Problem:Fracture of femur    Chief Complaint:   Chief Complaint   Patient presents with    Hip Pain        HPI: Ms Fermin is an 81 yo female who presented to the ED with c/o R leg/hip pain that started after a fall a week ago. She was seen at an outside clinic and had imaging done that did not show a fracture. She was sent home. She has continued to have problems walking and continued pain. Imaging in the ED today revealed a comminuted impacted fracture of the right-sided femoral neck. She is being admitted to the hospitalist service with consult to orthopedics.     She has a PMH of hypothyroidism, osteoarthritis, neuropathy, and COPD. She denies any ETOH or smoking. She lives at home. She has not been vaccinated for covid; she has not had covid. She is considered a full code.       Past Medical History:   Diagnosis Date    Arthritis     COPD (chronic obstructive pulmonary disease)     Digestive disorder     Thyroid disease        Past Surgical History:   Procedure Laterality Date    ABDOMINAL SURGERY      HYSTERECTOMY         Review of patient's allergies indicates:  No Known Allergies    No current facility-administered medications on file prior to encounter.     Current Outpatient Medications on File Prior to Encounter   Medication Sig    acetaminophen (TYLENOL) 325 MG tablet Take by mouth.    aspirin (ECOTRIN) 81 MG EC tablet Take 81 mg by mouth once daily.    calcium-vitamin D 600 mg-10 mcg (400 unit) Tab Take 1 tablet by mouth daily as needed.    dicyclomine (BENTYL) 10 MG capsule     docusate sodium  (COLACE) 100 MG capsule Take by mouth.    gabapentin (NEURONTIN) 300 MG capsule Take 300 mg by mouth.    ipratropium (ATROVENT HFA) 17 mcg/actuation inhaler Inhale into the lungs.    levothyroxine (SYNTHROID) 50 MCG tablet Take 50 mcg by mouth.    meloxicam (MOBIC) 15 MG tablet Take 15 mg by mouth once daily.    multivitamin (THERAGRAN) per tablet Take 1 tablet by mouth once daily.    omeprazole (PRILOSEC) 20 MG capsule Take 20 mg by mouth.    perphenazine-amitriptyline 2-25 mg 2-25 mg Tab     promethazine (PHENERGAN) 25 MG tablet Take by mouth.    VOLTAREN 1 % Gel     ZANAFLEX 4 mg tablet      Family History    None       Tobacco Use    Smoking status: Never Smoker    Smokeless tobacco: Never Used   Substance and Sexual Activity    Alcohol use: Never    Drug use: Never    Sexual activity: Not on file     Review of Systems   Constitutional:  Positive for activity change. Negative for chills and fever.   HENT:  Negative for congestion and postnasal drip.    Eyes:  Negative for photophobia and visual disturbance.   Respiratory:  Negative for cough and shortness of breath.    Cardiovascular:  Negative for chest pain.   Gastrointestinal:  Negative for abdominal distention, diarrhea and nausea.   Endocrine: Negative for cold intolerance and heat intolerance.   Genitourinary:  Negative for difficulty urinating and dysuria.   Musculoskeletal:  Positive for gait problem and myalgias.   Skin:  Negative for color change and pallor.   Allergic/Immunologic: Negative for environmental allergies and food allergies.   Neurological:  Negative for dizziness, weakness and numbness.   Psychiatric/Behavioral:  Negative for agitation. The patient is not nervous/anxious.    All other systems reviewed and are negative.  Objective:     Vital Signs (Most Recent):  Temp: 97.8 °F (36.6 °C) (07/15/22 1415)  Pulse: 70 (07/15/22 1415)  Resp: 16 (07/15/22 1643)  BP: (!) 149/50 (07/15/22 1415)  SpO2: 98 % (07/15/22 1415)   Vital  Signs (24h Range):  Temp:  [97.8 °F (36.6 °C)] 97.8 °F (36.6 °C)  Pulse:  [70] 70  Resp:  [16-18] 16  SpO2:  [98 %] 98 %  BP: (149)/(50) 149/50        Body mass index is 23.43 kg/m².    Physical Exam  Vitals and nursing note reviewed.   Constitutional:       Appearance: Normal appearance.   HENT:      Head: Normocephalic.      Mouth/Throat:      Mouth: Mucous membranes are moist.   Eyes:      Pupils: Pupils are equal, round, and reactive to light.   Cardiovascular:      Rate and Rhythm: Normal rate and regular rhythm.      Pulses: Normal pulses.      Heart sounds: Normal heart sounds.   Pulmonary:      Effort: Pulmonary effort is normal.      Breath sounds: Normal breath sounds.   Abdominal:      General: Bowel sounds are normal. There is no distension.      Palpations: Abdomen is soft.      Tenderness: There is no abdominal tenderness.   Musculoskeletal:         General: Normal range of motion.      Cervical back: Neck supple.        Legs:       Comments: R hipwith tenderness      Skin:     General: Skin is warm and dry.      Capillary Refill: Capillary refill takes less than 2 seconds.   Neurological:      Mental Status: She is alert and oriented to person, place, and time. Mental status is at baseline.      GCS: GCS eye subscore is 4. GCS verbal subscore is 5. GCS motor subscore is 6.      Sensory: Sensation is intact.      Motor: Motor function is intact.   Psychiatric:         Mood and Affect: Mood normal.         Behavior: Behavior normal.         CRANIAL NERVES     CN III, IV, VI   Pupils are equal, round, and reactive to light.     Significant Labs: All pertinent labs within the past 24 hours have been reviewed.  Recent Lab Results         07/15/22  1606   07/15/22  1600        Anion Gap   10       aPTT   30.2       Baso #   0.02       Basophil %   0.3       BUN   21       BUN/CREAT RATIO   21       Calcium   8.6       Chloride   105       CO2   28       Creatinine   1.01       Differential Type   Auto        eGFR if non    56       Eos #   0.13       Eosinophil %   1.9       Glucose   101       Group & Rh O POS         Hematocrit   36.3       Hemoglobin   11.9       Immature Grans (Abs)   0.01       Immature Granulocytes   0.1       INDIRECT MERYL NEG         INR   1.01       Lymph #   1.31       Lymph %   19.0       MCH   29.7       MCHC   32.8       MCV   90.5       Mono #   0.45       Mono %   6.5       MPV   8.4       Neutrophils, Abs   4.96       Neutrophils Relative   72.2       nRBC   0.0       NUCLEATED RBC ABSOLUTE   0.00       Platelets   289       Potassium   4.1       Protime   12.9       RBC   4.01       RDW   13.4       Sodium   139       WBC   6.88               Significant Imaging: I have reviewed all pertinent imaging results/findings within the past 24 hours.  Imaging Results              X-Ray Chest 1 View (Final result)  Result time 07/15/22 16:00:33      Final result by Ayo Kasper II, MD (07/15/22 16:00:33)                   Impression:      Chronic lung changes.  No acute process or significant change.      Electronically signed by: Ayo Kasper  Date:    07/15/2022  Time:    16:00               Narrative:    EXAMINATION:  XR CHEST 1 VIEW    CLINICAL HISTORY:  Encounter for other preprocedural examination    COMPARISON:  29 May 2015    FINDINGS:  The heart and mediastinum are normal in size and configuration.  The pulmonary vascularity is normal in caliber. Lung volumes are increased with prominent bronchial markings.  No lung infiltrates, effusions, pneumothorax or other abnormality is demonstrated.                                       CT Hip Without Contrast Right (Final result)  Result time 07/15/22 15:53:56      Final result by Krishan Caballero DO (07/15/22 15:53:56)                   Impression:      Acute mildly comminuted impacted fracture of the right-sided femoral neck. No joint dislocation.      Electronically signed by: Krishan  Federico  Date:    07/15/2022  Time:    15:53               Narrative:    EXAMINATION:  CT HIP WITHOUT CONTRAST RIGHT    CLINICAL HISTORY:  hip pain;    TECHNIQUE:  CT HIP WITHOUT CONTRAST RIGHT    COMPARISON:  07/11/2022    FINDINGS:  Acute mildly comminuted impacted fracture of the right-sided femoral neck.  No joint dislocation.    No hematoma.  No fluid collection.    The visualized pelvis is normal without hematoma or retroperitoneal blood products.                                        Assessment/Plan:     * Fracture of femur  - acute  - comminuted impacted fracture of the right-sided femoral neck  - Moraes consulted   - NPO p MN   - D5NS IVF  - EKG and CXR without acute processes   - continue home med- holding ASA        Neuropathy  - chronic, stable  - continue gabapentin       Hypothyroidism  - chronic, stable  - continue levothyroxine       GERD (gastroesophageal reflux disease)  - chronic, stable  - continue PPI       VTE Risk Mitigation (From admission, onward)         Ordered     IP VTE HIGH RISK PATIENT  Once         07/15/22 1732     Place sequential compression device  Until discontinued         07/15/22 1732                   QUAN Malcolm  Department of Hospital Medicine   [facility] [place] - Emergency Department

## 2022-07-16 ENCOUNTER — ANESTHESIA (OUTPATIENT)
Dept: SURGERY | Facility: HOSPITAL | Age: 82
DRG: 482 | End: 2022-07-16
Payer: MEDICARE

## 2022-07-16 ENCOUNTER — ANESTHESIA EVENT (OUTPATIENT)
Dept: SURGERY | Facility: HOSPITAL | Age: 82
DRG: 482 | End: 2022-07-16
Payer: MEDICARE

## 2022-07-16 LAB
ANION GAP SERPL CALCULATED.3IONS-SCNC: 13 MMOL/L (ref 7–16)
ANION GAP SERPL CALCULATED.3IONS-SCNC: 8 MMOL/L (ref 7–16)
BASOPHILS # BLD AUTO: 0.02 K/UL (ref 0–0.2)
BASOPHILS # BLD AUTO: 0.02 K/UL (ref 0–0.2)
BASOPHILS NFR BLD AUTO: 0.3 % (ref 0–1)
BASOPHILS NFR BLD AUTO: 0.5 % (ref 0–1)
BUN SERPL-MCNC: 20 MG/DL (ref 7–18)
BUN SERPL-MCNC: 20 MG/DL (ref 7–18)
BUN/CREAT SERPL: 26 (ref 6–20)
BUN/CREAT SERPL: 27 (ref 6–20)
CALCIUM SERPL-MCNC: 7.9 MG/DL (ref 8.5–10.1)
CALCIUM SERPL-MCNC: 8.3 MG/DL (ref 8.5–10.1)
CHLORIDE SERPL-SCNC: 107 MMOL/L (ref 98–107)
CHLORIDE SERPL-SCNC: 107 MMOL/L (ref 98–107)
CO2 SERPL-SCNC: 24 MMOL/L (ref 21–32)
CO2 SERPL-SCNC: 28 MMOL/L (ref 21–32)
CREAT SERPL-MCNC: 0.74 MG/DL (ref 0.55–1.02)
CREAT SERPL-MCNC: 0.76 MG/DL (ref 0.55–1.02)
DIFFERENTIAL METHOD BLD: ABNORMAL
DIFFERENTIAL METHOD BLD: ABNORMAL
EOSINOPHIL # BLD AUTO: 0.21 K/UL (ref 0–0.5)
EOSINOPHIL # BLD AUTO: 0.22 K/UL (ref 0–0.5)
EOSINOPHIL NFR BLD AUTO: 3.7 % (ref 1–4)
EOSINOPHIL NFR BLD AUTO: 5.3 % (ref 1–4)
ERYTHROCYTE [DISTWIDTH] IN BLOOD BY AUTOMATED COUNT: 13.5 % (ref 11.5–14.5)
ERYTHROCYTE [DISTWIDTH] IN BLOOD BY AUTOMATED COUNT: 13.7 % (ref 11.5–14.5)
GLUCOSE SERPL-MCNC: 105 MG/DL (ref 74–106)
GLUCOSE SERPL-MCNC: 125 MG/DL (ref 74–106)
HCT VFR BLD AUTO: 33 % (ref 38–47)
HCT VFR BLD AUTO: 33.8 % (ref 38–47)
HGB BLD-MCNC: 10.8 G/DL (ref 12–16)
HGB BLD-MCNC: 10.9 G/DL (ref 12–16)
IMM GRANULOCYTES # BLD AUTO: 0.01 K/UL (ref 0–0.04)
IMM GRANULOCYTES # BLD AUTO: 0.02 K/UL (ref 0–0.04)
IMM GRANULOCYTES NFR BLD: 0.2 % (ref 0–0.4)
IMM GRANULOCYTES NFR BLD: 0.3 % (ref 0–0.4)
LYMPHOCYTES # BLD AUTO: 1.19 K/UL (ref 1–4.8)
LYMPHOCYTES # BLD AUTO: 1.36 K/UL (ref 1–4.8)
LYMPHOCYTES NFR BLD AUTO: 20.7 % (ref 27–41)
LYMPHOCYTES NFR BLD AUTO: 32.5 % (ref 27–41)
MAGNESIUM SERPL-MCNC: 2 MG/DL (ref 1.7–2.3)
MCH RBC QN AUTO: 29.9 PG (ref 27–31)
MCH RBC QN AUTO: 29.9 PG (ref 27–31)
MCHC RBC AUTO-ENTMCNC: 32 G/DL (ref 32–36)
MCHC RBC AUTO-ENTMCNC: 33 G/DL (ref 32–36)
MCV RBC AUTO: 90.7 FL (ref 80–96)
MCV RBC AUTO: 93.6 FL (ref 80–96)
MONOCYTES # BLD AUTO: 0.47 K/UL (ref 0–0.8)
MONOCYTES # BLD AUTO: 0.49 K/UL (ref 0–0.8)
MONOCYTES NFR BLD AUTO: 11.7 % (ref 2–6)
MONOCYTES NFR BLD AUTO: 8.2 % (ref 2–6)
MPC BLD CALC-MCNC: 8.5 FL (ref 9.4–12.4)
MPC BLD CALC-MCNC: 8.7 FL (ref 9.4–12.4)
NEUTROPHILS # BLD AUTO: 2.08 K/UL (ref 1.8–7.7)
NEUTROPHILS # BLD AUTO: 3.83 K/UL (ref 1.8–7.7)
NEUTROPHILS NFR BLD AUTO: 49.8 % (ref 53–65)
NEUTROPHILS NFR BLD AUTO: 66.8 % (ref 53–65)
NRBC # BLD AUTO: 0 X10E3/UL
NRBC # BLD AUTO: 0 X10E3/UL
NRBC, AUTO (.00): 0 %
NRBC, AUTO (.00): 0 %
PLATELET # BLD AUTO: 232 K/UL (ref 150–400)
PLATELET # BLD AUTO: 269 K/UL (ref 150–400)
POTASSIUM SERPL-SCNC: 3.7 MMOL/L (ref 3.5–5.1)
POTASSIUM SERPL-SCNC: 4.3 MMOL/L (ref 3.5–5.1)
RBC # BLD AUTO: 3.61 M/UL (ref 4.2–5.4)
RBC # BLD AUTO: 3.64 M/UL (ref 4.2–5.4)
SODIUM SERPL-SCNC: 139 MMOL/L (ref 136–145)
SODIUM SERPL-SCNC: 140 MMOL/L (ref 136–145)
TSH SERPL DL<=0.005 MIU/L-ACNC: 3.06 UIU/ML (ref 0.36–3.74)
WBC # BLD AUTO: 4.18 K/UL (ref 4.5–11)
WBC # BLD AUTO: 5.74 K/UL (ref 4.5–11)

## 2022-07-16 PROCEDURE — 27000221 HC OXYGEN, UP TO 24 HOURS

## 2022-07-16 PROCEDURE — 99232 SBSQ HOSP IP/OBS MODERATE 35: CPT | Mod: ,,, | Performed by: FAMILY MEDICINE

## 2022-07-16 PROCEDURE — C1769 GUIDE WIRE: HCPCS | Performed by: ORTHOPAEDIC SURGERY

## 2022-07-16 PROCEDURE — 80048 BASIC METABOLIC PNL TOTAL CA: CPT | Performed by: NURSE PRACTITIONER

## 2022-07-16 PROCEDURE — 25000003 PHARM REV CODE 250: Performed by: NURSE PRACTITIONER

## 2022-07-16 PROCEDURE — 37000008 HC ANESTHESIA 1ST 15 MINUTES: Performed by: ORTHOPAEDIC SURGERY

## 2022-07-16 PROCEDURE — 63600175 PHARM REV CODE 636 W HCPCS: Performed by: ORTHOPAEDIC SURGERY

## 2022-07-16 PROCEDURE — 99900035 HC TECH TIME PER 15 MIN (STAT)

## 2022-07-16 PROCEDURE — 11000001 HC ACUTE MED/SURG PRIVATE ROOM

## 2022-07-16 PROCEDURE — 83735 ASSAY OF MAGNESIUM: CPT | Performed by: NURSE PRACTITIONER

## 2022-07-16 PROCEDURE — 99232 PR SUBSEQUENT HOSPITAL CARE,LEVL II: ICD-10-PCS | Mod: ,,, | Performed by: FAMILY MEDICINE

## 2022-07-16 PROCEDURE — 36000711: Performed by: ORTHOPAEDIC SURGERY

## 2022-07-16 PROCEDURE — 27201423 OPTIME MED/SURG SUP & DEVICES STERILE SUPPLY: Performed by: ORTHOPAEDIC SURGERY

## 2022-07-16 PROCEDURE — 25000003 PHARM REV CODE 250: Performed by: NURSE ANESTHETIST, CERTIFIED REGISTERED

## 2022-07-16 PROCEDURE — 27000689 HC BLADE LARYNGOSCOPE ANY SIZE: Performed by: ANESTHESIOLOGY

## 2022-07-16 PROCEDURE — 36000710: Performed by: ORTHOPAEDIC SURGERY

## 2022-07-16 PROCEDURE — 25000003 PHARM REV CODE 250: Performed by: ORTHOPAEDIC SURGERY

## 2022-07-16 PROCEDURE — D9220A PRA ANESTHESIA: ICD-10-PCS | Mod: CRNA,,, | Performed by: NURSE ANESTHETIST, CERTIFIED REGISTERED

## 2022-07-16 PROCEDURE — 80048 BASIC METABOLIC PNL TOTAL CA: CPT | Performed by: ORTHOPAEDIC SURGERY

## 2022-07-16 PROCEDURE — 84443 ASSAY THYROID STIM HORMONE: CPT | Performed by: NURSE PRACTITIONER

## 2022-07-16 PROCEDURE — 63600175 PHARM REV CODE 636 W HCPCS: Performed by: NURSE PRACTITIONER

## 2022-07-16 PROCEDURE — 36415 COLL VENOUS BLD VENIPUNCTURE: CPT | Performed by: ORTHOPAEDIC SURGERY

## 2022-07-16 PROCEDURE — 85025 COMPLETE CBC W/AUTO DIFF WBC: CPT | Performed by: ORTHOPAEDIC SURGERY

## 2022-07-16 PROCEDURE — 27000510 HC BLANKET BAIR HUGGER ANY SIZE: Performed by: ANESTHESIOLOGY

## 2022-07-16 PROCEDURE — D9220A PRA ANESTHESIA: Mod: ANES,,, | Performed by: ANESTHESIOLOGY

## 2022-07-16 PROCEDURE — D9220A PRA ANESTHESIA: ICD-10-PCS | Mod: ANES,,, | Performed by: ANESTHESIOLOGY

## 2022-07-16 PROCEDURE — 27000716 HC OXISENSOR PROBE, ANY SIZE: Performed by: ANESTHESIOLOGY

## 2022-07-16 PROCEDURE — 85025 COMPLETE CBC W/AUTO DIFF WBC: CPT | Performed by: NURSE PRACTITIONER

## 2022-07-16 PROCEDURE — 94761 N-INVAS EAR/PLS OXIMETRY MLT: CPT

## 2022-07-16 PROCEDURE — 27000655: Performed by: ANESTHESIOLOGY

## 2022-07-16 PROCEDURE — 37000009 HC ANESTHESIA EA ADD 15 MINS: Performed by: ORTHOPAEDIC SURGERY

## 2022-07-16 PROCEDURE — 25000003 PHARM REV CODE 250: Performed by: HOSPITALIST

## 2022-07-16 PROCEDURE — 36415 COLL VENOUS BLD VENIPUNCTURE: CPT | Performed by: NURSE PRACTITIONER

## 2022-07-16 PROCEDURE — 63600175 PHARM REV CODE 636 W HCPCS: Performed by: NURSE ANESTHETIST, CERTIFIED REGISTERED

## 2022-07-16 PROCEDURE — 27000165 HC TUBE, ETT CUFFED: Performed by: ANESTHESIOLOGY

## 2022-07-16 PROCEDURE — C1713 ANCHOR/SCREW BN/BN,TIS/BN: HCPCS | Performed by: ORTHOPAEDIC SURGERY

## 2022-07-16 PROCEDURE — D9220A PRA ANESTHESIA: Mod: CRNA,,, | Performed by: NURSE ANESTHETIST, CERTIFIED REGISTERED

## 2022-07-16 PROCEDURE — 71000033 HC RECOVERY, INTIAL HOUR: Performed by: ORTHOPAEDIC SURGERY

## 2022-07-16 DEVICE — IMPLANTABLE DEVICE: Type: IMPLANTABLE DEVICE | Site: HIP | Status: FUNCTIONAL

## 2022-07-16 RX ORDER — ROCURONIUM BROMIDE 10 MG/ML
INJECTION, SOLUTION INTRAVENOUS
Status: DISCONTINUED | OUTPATIENT
Start: 2022-07-16 | End: 2022-07-16

## 2022-07-16 RX ORDER — HYDROMORPHONE HYDROCHLORIDE 2 MG/ML
0.5 INJECTION, SOLUTION INTRAMUSCULAR; INTRAVENOUS; SUBCUTANEOUS EVERY 5 MIN PRN
Status: DISCONTINUED | OUTPATIENT
Start: 2022-07-16 | End: 2022-07-18 | Stop reason: HOSPADM

## 2022-07-16 RX ORDER — ONDANSETRON 2 MG/ML
4 INJECTION INTRAMUSCULAR; INTRAVENOUS DAILY PRN
Status: DISCONTINUED | OUTPATIENT
Start: 2022-07-16 | End: 2022-07-18 | Stop reason: HOSPADM

## 2022-07-16 RX ORDER — MUPIROCIN 20 MG/G
1 OINTMENT TOPICAL 2 TIMES DAILY
Status: DISCONTINUED | OUTPATIENT
Start: 2022-07-16 | End: 2022-07-18 | Stop reason: HOSPADM

## 2022-07-16 RX ORDER — MEPERIDINE HYDROCHLORIDE 25 MG/ML
25 INJECTION INTRAMUSCULAR; INTRAVENOUS; SUBCUTANEOUS EVERY 10 MIN PRN
Status: ACTIVE | OUTPATIENT
Start: 2022-07-16 | End: 2022-07-16

## 2022-07-16 RX ORDER — ONDANSETRON 2 MG/ML
4 INJECTION INTRAMUSCULAR; INTRAVENOUS EVERY 8 HOURS PRN
Status: DISCONTINUED | OUTPATIENT
Start: 2022-07-16 | End: 2022-07-18 | Stop reason: HOSPADM

## 2022-07-16 RX ORDER — MORPHINE SULFATE 4 MG/ML
4 INJECTION, SOLUTION INTRAMUSCULAR; INTRAVENOUS EVERY 4 HOURS PRN
Status: DISCONTINUED | OUTPATIENT
Start: 2022-07-16 | End: 2022-07-18 | Stop reason: HOSPADM

## 2022-07-16 RX ORDER — PROPOFOL 10 MG/ML
VIAL (ML) INTRAVENOUS
Status: DISCONTINUED | OUTPATIENT
Start: 2022-07-16 | End: 2022-07-16

## 2022-07-16 RX ORDER — DOCUSATE SODIUM 100 MG/1
200 CAPSULE, LIQUID FILLED ORAL ONCE
Status: COMPLETED | OUTPATIENT
Start: 2022-07-16 | End: 2022-07-16

## 2022-07-16 RX ORDER — BISACODYL 10 MG
10 SUPPOSITORY, RECTAL RECTAL DAILY PRN
Status: DISCONTINUED | OUTPATIENT
Start: 2022-07-16 | End: 2022-07-18 | Stop reason: HOSPADM

## 2022-07-16 RX ORDER — LIDOCAINE HYDROCHLORIDE 20 MG/ML
INJECTION, SOLUTION EPIDURAL; INFILTRATION; INTRACAUDAL; PERINEURAL
Status: DISCONTINUED | OUTPATIENT
Start: 2022-07-16 | End: 2022-07-16

## 2022-07-16 RX ORDER — MORPHINE SULFATE 8 MG/ML
4 INJECTION INTRAMUSCULAR; INTRAVENOUS; SUBCUTANEOUS EVERY 5 MIN PRN
Status: DISCONTINUED | OUTPATIENT
Start: 2022-07-16 | End: 2022-07-18 | Stop reason: HOSPADM

## 2022-07-16 RX ORDER — CEFAZOLIN SODIUM 1 G/3ML
INJECTION, POWDER, FOR SOLUTION INTRAMUSCULAR; INTRAVENOUS
Status: DISCONTINUED | OUTPATIENT
Start: 2022-07-16 | End: 2022-07-16

## 2022-07-16 RX ORDER — ONDANSETRON 2 MG/ML
INJECTION INTRAMUSCULAR; INTRAVENOUS
Status: DISCONTINUED | OUTPATIENT
Start: 2022-07-16 | End: 2022-07-16

## 2022-07-16 RX ORDER — HYDROCODONE BITARTRATE AND ACETAMINOPHEN 5; 325 MG/1; MG/1
1 TABLET ORAL EVERY 4 HOURS PRN
Status: DISCONTINUED | OUTPATIENT
Start: 2022-07-16 | End: 2022-07-18 | Stop reason: HOSPADM

## 2022-07-16 RX ORDER — DIPHENHYDRAMINE HYDROCHLORIDE 50 MG/ML
25 INJECTION INTRAMUSCULAR; INTRAVENOUS EVERY 6 HOURS PRN
Status: DISCONTINUED | OUTPATIENT
Start: 2022-07-16 | End: 2022-07-18 | Stop reason: HOSPADM

## 2022-07-16 RX ORDER — DEXAMETHASONE SODIUM PHOSPHATE 4 MG/ML
INJECTION, SOLUTION INTRA-ARTICULAR; INTRALESIONAL; INTRAMUSCULAR; INTRAVENOUS; SOFT TISSUE
Status: DISCONTINUED | OUTPATIENT
Start: 2022-07-16 | End: 2022-07-16

## 2022-07-16 RX ORDER — FENTANYL CITRATE 50 UG/ML
INJECTION, SOLUTION INTRAMUSCULAR; INTRAVENOUS
Status: DISCONTINUED | OUTPATIENT
Start: 2022-07-16 | End: 2022-07-16

## 2022-07-16 RX ORDER — SODIUM CHLORIDE 9 MG/ML
75 INJECTION, SOLUTION INTRAVENOUS CONTINUOUS
Status: DISCONTINUED | OUTPATIENT
Start: 2022-07-16 | End: 2022-07-18 | Stop reason: HOSPADM

## 2022-07-16 RX ORDER — CEFAZOLIN SODIUM 2 G/50ML
2 SOLUTION INTRAVENOUS
Status: COMPLETED | OUTPATIENT
Start: 2022-07-16 | End: 2022-07-17

## 2022-07-16 RX ADMIN — SUGAMMADEX 200 MG: 100 INJECTION, SOLUTION INTRAVENOUS at 11:07

## 2022-07-16 RX ADMIN — MUPIROCIN 1 G: 20 OINTMENT TOPICAL at 12:07

## 2022-07-16 RX ADMIN — SODIUM CHLORIDE 75 ML/HR: 9 INJECTION, SOLUTION INTRAVENOUS at 09:07

## 2022-07-16 RX ADMIN — DOCUSATE SODIUM 200 MG: 100 CAPSULE, LIQUID FILLED ORAL at 09:07

## 2022-07-16 RX ADMIN — MUPIROCIN: 20 OINTMENT TOPICAL at 09:07

## 2022-07-16 RX ADMIN — DEXTROSE AND SODIUM CHLORIDE: 5; 900 INJECTION, SOLUTION INTRAVENOUS at 05:07

## 2022-07-16 RX ADMIN — LEVOTHYROXINE SODIUM 50 MCG: 50 TABLET ORAL at 05:07

## 2022-07-16 RX ADMIN — ROCURONIUM BROMIDE 30 MG: 10 INJECTION, SOLUTION INTRAVENOUS at 10:07

## 2022-07-16 RX ADMIN — HYDROCODONE BITARTRATE AND ACETAMINOPHEN 1 TABLET: 5; 325 TABLET ORAL at 09:07

## 2022-07-16 RX ADMIN — GABAPENTIN 300 MG: 300 CAPSULE ORAL at 09:07

## 2022-07-16 RX ADMIN — HYDROCODONE BITARTRATE AND ACETAMINOPHEN 1 TABLET: 5; 325 TABLET ORAL at 12:07

## 2022-07-16 RX ADMIN — LIDOCAINE HYDROCHLORIDE 80 MG: 20 INJECTION, SOLUTION INTRAVENOUS at 10:07

## 2022-07-16 RX ADMIN — SODIUM CHLORIDE 75 ML/HR: 9 INJECTION, SOLUTION INTRAVENOUS at 12:07

## 2022-07-16 RX ADMIN — PROPOFOL 100 MG: 10 INJECTION, EMULSION INTRAVENOUS at 10:07

## 2022-07-16 RX ADMIN — CEFAZOLIN SODIUM 2 G: 10 INJECTION, POWDER, FOR SOLUTION INTRAVENOUS at 09:07

## 2022-07-16 RX ADMIN — MELATONIN 6 MG: at 09:07

## 2022-07-16 RX ADMIN — ONDANSETRON 4 MG: 2 INJECTION INTRAMUSCULAR; INTRAVENOUS at 10:07

## 2022-07-16 RX ADMIN — DEXAMETHASONE SODIUM PHOSPHATE 84 MG: 4 INJECTION, SOLUTION INTRA-ARTICULAR; INTRALESIONAL; INTRAMUSCULAR; INTRAVENOUS; SOFT TISSUE at 10:07

## 2022-07-16 RX ADMIN — CEFAZOLIN 2 G: 1 INJECTION, POWDER, FOR SOLUTION INTRAMUSCULAR; INTRAVENOUS; PARENTERAL at 10:07

## 2022-07-16 RX ADMIN — TIZANIDINE 4 MG: 4 TABLET ORAL at 09:07

## 2022-07-16 RX ADMIN — FENTANYL CITRATE 100 MCG: 50 INJECTION INTRAMUSCULAR; INTRAVENOUS at 10:07

## 2022-07-16 NOTE — ANESTHESIA POSTPROCEDURE EVALUATION
Anesthesia Post Evaluation    Patient: Anibal Fermin    Procedure(s) Performed: Procedure(s) (LRB):  PINNING, HIP, PERCUTANEOUS (Right)    Final Anesthesia Type: general      Patient location during evaluation: PACU  Post-procedure vital signs: reviewed and stable  Pain management: adequate  Airway patency: patent    PONV status at discharge: No PONV  Anesthetic complications: no      Cardiovascular status: hemodynamically stable  Respiratory status: unassisted  Hydration status: euvolemic  Follow-up not needed.          Vitals Value Taken Time   /64 07/16/22 1152   Temp 36.4 °C (97.6 °F) 07/16/22 1130   Pulse 71 07/16/22 1157   Resp 13 07/16/22 1141   SpO2 98 % 07/16/22 1157   Vitals shown include unvalidated device data.      No case tracking events are documented in the log.      Pain/Haily Score: Pain Rating Prior to Med Admin: 5 (7/15/2022 10:12 PM)  Pain Rating Post Med Admin: 3 (7/15/2022 11:12 PM)  Haily Score: 9 (7/16/2022 11:40 AM)

## 2022-07-16 NOTE — TRANSFER OF CARE
"Anesthesia Transfer of Care Note    Patient: Anibal Fermin    Procedure(s) Performed: Procedure(s) (LRB):  PINNING, HIP, PERCUTANEOUS (Right)    Patient location: PACU    Anesthesia Type: general    Transport from OR: Transported from OR on room air with adequate spontaneous ventilation    Post pain: adequate analgesia    Post assessment: no apparent anesthetic complications and tolerated procedure well    Post vital signs: stable    Level of consciousness: responds to stimulation    Nausea/Vomiting: no nausea/vomiting    Complications: none    Transfer of care protocol was followedComments: Report Given to PACU rn VSS      Last vitals:   Visit Vitals  /76 (BP Location: Right arm, Patient Position: Lying)   Pulse (!) 17   Temp 36.4 °C (97.6 °F) (Oral)   Resp 13   Ht 4' 11" (1.499 m)   Wt 47.3 kg (104 lb 4.4 oz)   SpO2 98%   Breastfeeding No   BMI 21.06 kg/m²     "

## 2022-07-16 NOTE — PROGRESS NOTES
[facility] - [address] [place] [address] Medicine  Progress Note    Patient Name: Anibal Fermin  MRN: [id number]  Patient Class: IP- Inpatient   Admission Date: 7/15/2022  Length of Stay: 1 days  Attending Physician: Toribio Baker MD  Primary Care Provider: Brandan Brandon DO (Inactive)        Subjective:     Principal Problem:Fracture of femur        HPI:  Ms Fermin is an 83 yo female who presented to the ED with c/o R leg/hip pain that started after a fall a week ago. She was seen at an outside clinic and had imaging done that did not show a fracture. She was sent home. She has continued to have problems walking and continued pain. Imaging in the ED today revealed a comminuted impacted fracture of the right-sided femoral neck. She is being admitted to the hospitalist service with consult to orthopedics.     She has a PMH of hypothyroidism, osteoarthritis, neuropathy, and COPD. She denies any ETOH or smoking. She lives at home. She has not been vaccinated for covid; she has not had covid. She is considered a full code.       Overview/Hospital Course:  7/16: plans for surgical procedure today; will have PT/OT see and SS for d/c planning       Interval History: Pt resting in bed. Daughter at bedside. Denies any needs. Has some R hip tenderness with movement- as expected. Denies any CP, SOB, abd pain. Plans for surgical intervention today. Verbalizes understanding.     Review of Systems   Constitutional:  Positive for activity change.   HENT:  Negative for postnasal drip.    Respiratory:  Negative for cough and shortness of breath.    Cardiovascular:  Negative for chest pain.   Gastrointestinal:  Negative for abdominal distention, diarrhea and nausea.   Musculoskeletal:  Positive for arthralgias, gait problem and myalgias.   All other systems reviewed and are negative.  Objective:     Vital Signs (Most Recent):  Temp: 97.9 °F (36.6 °C) (07/16/22 0800)  Pulse: (!) 57 (07/16/22 0800)  Resp: 18  (07/16/22 0800)  BP: (!) 108/46 (07/16/22 0800)  SpO2: (!) 93 % (07/16/22 0800)   Vital Signs (24h Range):  Temp:  [97.8 °F (36.6 °C)-98.4 °F (36.9 °C)] 97.9 °F (36.6 °C)  Pulse:  [56-70] 57  Resp:  [16-18] 18  SpO2:  [93 %-98 %] 93 %  BP: (108-150)/(44-55) 108/46     Weight: 47.3 kg (104 lb 4.4 oz)  Body mass index is 21.06 kg/m².  No intake or output data in the 24 hours ending 07/16/22 1026   Physical Exam  Vitals and nursing note reviewed.   Constitutional:       Appearance: Normal appearance.   HENT:      Head: Normocephalic.      Mouth/Throat:      Mouth: Mucous membranes are moist.   Eyes:      Pupils: Pupils are equal, round, and reactive to light.   Cardiovascular:      Rate and Rhythm: Normal rate and regular rhythm.      Pulses: Normal pulses.      Heart sounds: Normal heart sounds.   Pulmonary:      Effort: Pulmonary effort is normal.      Breath sounds: Normal breath sounds.   Abdominal:      General: Bowel sounds are normal. There is no distension.      Palpations: Abdomen is soft.      Tenderness: There is no abdominal tenderness.   Musculoskeletal:         General: Normal range of motion.      Cervical back: Neck supple.        Legs:       Comments: R hipwith tenderness- distal pulses palpable with appropriate sensation      Skin:     General: Skin is warm and dry.      Capillary Refill: Capillary refill takes less than 2 seconds.   Neurological:      Mental Status: She is alert and oriented to person, place, and time. Mental status is at baseline.      GCS: GCS eye subscore is 4. GCS verbal subscore is 5. GCS motor subscore is 6.      Sensory: Sensation is intact.      Motor: Motor function is intact.   Psychiatric:         Mood and Affect: Mood normal.         Behavior: Behavior normal.       Significant Labs: All pertinent labs within the past 24 hours have been reviewed.  Recent Lab Results         07/16/22  0352   07/15/22  1727   07/15/22  1606   07/15/22  1600        Influenza B, Molecular    Negative           Anion Gap 8       10       aPTT       30.2       Baso # 0.02       0.02       Basophil % 0.5       0.3       BUN 20       21       BUN/CREAT RATIO 26       21       Calcium 8.3       8.6       Chloride 107       105       CO2 28       28       COVID-19 Ag   Negative           Creatinine 0.76       1.01       Differential Type Auto       Auto       eGFR if non  77       56       Eos # 0.22       0.13       Eosinophil % 5.3       1.9       Glucose 125       101       Group & Rh     O POS         Hematocrit 33.0       36.3       Hemoglobin 10.9       11.9       Immature Grans (Abs) 0.01       0.01       Immature Granulocytes 0.2       0.1       INDIRECT MERYL     NEG         Influenza A   Negative           INR       1.01       Lymph # 1.36       1.31       Lymph % 32.5       19.0       Magnesium 2.0             MCH 29.9       29.7       MCHC 33.0       32.8       MCV 90.7       90.5       Mono # 0.49       0.45       Mono % 11.7       6.5       MPV 8.7       8.4       Neutrophils, Abs 2.08       4.96       Neutrophils Relative 49.8       72.2       nRBC 0.0       0.0       NUCLEATED RBC ABSOLUTE 0.00       0.00       Platelets 269       289       Potassium 3.7       4.1       Protime       12.9       RBC 3.64       4.01       RDW 13.7       13.4       Sodium 139       139       TSH 3.060             WBC 4.18       6.88               Significant Imaging: I have reviewed all pertinent imaging results/findings within the past 24 hours.      Assessment/Plan:      * Fracture of femur  - acute  - comminuted impacted fracture of the right-sided femoral neck  - Moraes consulted   - NPO p MN   - D5NS IVF  - EKG and CXR without acute processes   - continue home med- holding ASA  7/16  - OR today       Osteoarthritis        Neuropathy  - chronic, stable  - continue gabapentin       Hypothyroidism  - chronic, stable  - continue levothyroxine       GERD (gastroesophageal reflux disease)  -  chronic, stable  - continue PPI         VTE Risk Mitigation (From admission, onward)         Ordered     IP VTE HIGH RISK PATIENT  Once         07/15/22 1732     Place sequential compression device  Until discontinued         07/15/22 1732                Discharge Planning   YANI:      Code Status: Full Code   Is the patient medically ready for discharge?:     Reason for patient still in hospital (select all that apply): Treatment             QUAN Malcolm  Department of Hospital Medicine   [address]

## 2022-07-16 NOTE — SUBJECTIVE & OBJECTIVE
"Past Medical History:   Diagnosis Date    Arthritis     COPD (chronic obstructive pulmonary disease)     Digestive disorder     Thyroid disease        Past Surgical History:   Procedure Laterality Date    ABDOMINAL SURGERY      HYSTERECTOMY         Review of patient's allergies indicates:  No Known Allergies    Current Facility-Administered Medications   Medication    acetaminophen tablet 650 mg    aluminum-magnesium hydroxide-simethicone 200-200-20 mg/5 mL suspension 30 mL    dextrose 5 % and 0.9 % NaCl infusion    dextrose 50% injection 12.5 g    dextrose 50% injection 25 g    [START ON 7/16/2022] docusate sodium capsule 100 mg    gabapentin capsule 300 mg    glucagon (human recombinant) injection 1 mg    HYDROcodone-acetaminophen 5-325 mg per tablet 1 tablet    [START ON 7/16/2022] levothyroxine tablet 50 mcg    melatonin tablet 6 mg    morphine injection 4 mg    mupirocin 2 % ointment    naloxone 0.4 mg/mL injection 0.02 mg    ondansetron injection 4 mg    ondansetron injection 4 mg    [START ON 7/16/2022] pantoprazole EC tablet 40 mg    [START ON 7/16/2022] polyethylene glycol packet 17 g    simethicone chewable tablet 80 mg    sodium chloride 0.9% flush 10 mL    tiZANidine tablet 4 mg     Family History    None       Tobacco Use    Smoking status: Never Smoker    Smokeless tobacco: Never Used   Substance and Sexual Activity    Alcohol use: Never    Drug use: Never    Sexual activity: Not on file     Review of Systems   Musculoskeletal:  Positive for joint pain.   Objective:     Vital Signs (Most Recent):  Temp: 97.8 °F (36.6 °C) (07/15/22 1918)  Pulse: 62 (07/15/22 1918)  Resp: 16 (07/15/22 1918)  BP: (!) 150/53 (07/15/22 1918)  SpO2: 96 % (07/15/22 1918)   Vital Signs (24h Range):  Temp:  [97.8 °F (36.6 °C)] 97.8 °F (36.6 °C)  Pulse:  [62-70] 62  Resp:  [16-18] 16  SpO2:  [96 %-98 %] 96 %  BP: (149-150)/(50-53) 150/53        Height: 4' 11" (149.9 cm)  Body mass index is 23.43 kg/m².    No intake or output " data in the 24 hours ending 07/15/22 2201                Right Hip Exam     Tenderness   The patient tender to palpation of the trochanteric bursa.    Other   Sensation: normal      Vascular Exam     Right Pulses  Dorsalis Pedis:      1+          Significant Labs: All pertinent labs within the past 24 hours have been reviewed.    Significant Imaging: I have reviewed all pertinent imaging results/findings.

## 2022-07-16 NOTE — NURSING
Received pt from ed via stretcher. 20g intact to LAC. Pt reported not having any discomfort when lying still. VSS. Daughter at bedside. Will continue to monitor.

## 2022-07-16 NOTE — OR NURSING
1125 REC'D TO PACU IN STABLE COND. PT SLEEPING, WAKES TO VOICE. VS STABLE. NO DISTRESS NOTED @ THIS TIME.        1155 TRANSFERRED TO ROOM 562 IN STABLE COND. PT AWAKE & ALERT. VS STABLE  BEDSIDE REPORT GIVEN TO   RN. NO DISTRESS NOTED@   THIS TIME.

## 2022-07-16 NOTE — HOSPITAL COURSE
7/16: plans for surgical procedure today; will have PT/OT see and SS for d/c planning   7/17: s/p pinning of femur; PT/OT following; SS consulted; eliquis for dvt ppx   7/18: PT/OT; SWB pending; eliquis ongoing     Mrs. Fermin will be discharged home today after presenting to the ED with complaints of right leg/hip pain that was ongoing after a fall a week prior. She was noted to have a comminuted impacted fracture of the right-sided femoral neck. Orthopedics was consulted and she went to the OR for repair on 7/16/22. PT/OT was consulted and recommended SWB for d/c and patient has been accepted to [facility] for [facility] services today. She will continue PO Eliquis x 14 days for DVT ppx and will restart ASA 81 mg after Eliquis dosing is complete. Written instructions for discharge after hip fracture and how to prevent surgical site infections was given to patient prior to discharge. Discussed importance of medication compliance and follow ups with patient and she verbalized understanding.

## 2022-07-16 NOTE — HPI
82-year-old female who fell 6 days ago went to her nurse practitioner was noted on x-ray did not have a fracture continued to have pain and hip went to the ER today noted to have a fracture of the femoral neck after CT was obtained she has now been admitted to hospitalist for medical evaluation her right lower extremity she does move her toes she does have sensation to touch has palpable pulses tender over greater trochanter of her hip pain on attempted motion the hip x-rays show she has a fracture of the femoral neck it is impacted slightly displaced will plan on doing insight 2 pinning of the right hip if it displaces will plan on the prosthesis of the hip

## 2022-07-16 NOTE — ASSESSMENT & PLAN NOTE
- acute  - comminuted impacted fracture of the right-sided femoral neck  - Dimitry consulted   - NPO p MN   - D5NS IVF  - EKG and CXR without acute processes   - continue home med- holding ASA  7/16  - OR today

## 2022-07-16 NOTE — OP NOTE
[address]  General Surgery  Operative Note    SUMMARY     Date of Procedure: 7/16/2022     Procedure: Procedure(s) (LRB):  PINNING, HIP, PERCUTANEOUS (Right)       Surgeon(s) and Role:     * Gilberto Moraes MD - Primary    Assisting Surgeon: None    Pre-Operative Diagnosis: Closed fracture of neck of right femur, initial encounter [S72.001A]    Post-Operative Diagnosis: Post-Op Diagnosis Codes:     * Closed fracture of neck of right femur, initial encounter [S72.001A]    Anesthesia: Choice    Operative Findings (including complications, if any):  She was then positioned on the fracture table right lower extremity then placed in traction fracture was reduced noted be a valgus impacted femoral neck fracture.  At this time right lower extremities prepped draped sterile fashion.  Three guidewires for the [place] 6.5 cannulated Asnis screw set were placed into the femoral head confirmed under fluoroscopic vision being correct position stab incisions made screw lengths were measured at this time the near cortex was over drilled and 36.5 mm screws were placed maintaining reduction of the femoral head guide pins removed wound irrigated out copious amounts normal saline skin staples used to close the stab incisions sterile occlusive dressing placed patient was then awakened taken recovery room in good condition all counts were correct no complications    Description of Technical Procedures:     Significant Surgical Tasks Conducted by the Assistant(s), if Applicable:     Estimated Blood Loss (EBL): * No values recorded between 7/16/2022 10:59 AM and 7/16/2022 11:18 AM *           Implants:   Implant Name Type Inv. Item Serial No.  Lot No. LRB No. Used Action   IMP WIRE K GAMMA [id number] - [id number]  IMP WIRE K [employer]AM[employer] [id number]  [employer] ([facility]) [id number] Right 1 Implanted   IMP WIRE [employer] [id number] - [id number]  IMP WIRE K [employer] [id number]  [employer]  ([facility]) [id number] Right 1 Implanted       Specimens:   Specimen (24h ago, onward)            None                  Condition: Good    Disposition: PACU - hemodynamically stable.    Attestation: I was present and scrubbed for the entire procedure.

## 2022-07-16 NOTE — ANESTHESIA PREPROCEDURE EVALUATION
07/16/2022  Anibal Fermin is a 82 y.o., female.      Pre-op Assessment    I have reviewed the Patient Summary Reports.    I have reviewed the NPO Status.   I have reviewed the Medications.     Review of Systems         Anesthesia Plan  Type of Anesthesia, risks & benefits discussed:    Anesthesia Type: Gen Supraglottic Airway  Intra-op Monitoring Plan: Standard ASA Monitors  Post Op Pain Control Plan: IV/PO Opioids PRN  Induction:  IV  Informed Consent: Informed consent signed with the Patient and all parties understand the risks and agree with anesthesia plan.  All questions answered.   ASA Score: 3    Ready For Surgery From Anesthesia Perspective.     .  NPO greater than 8 hours  NAC  NKDA    Hct 33  Ca 8.3    Arthritis COPD (chronic obstructive pulmonary disease)   Thyroid disease    GERD  Advanced age    Airway exam deferred (COVID precautions); adequate ROM at neck.

## 2022-07-16 NOTE — ANESTHESIA PROCEDURE NOTES
Intubation    Date/Time: 7/16/2022 10:40 AM  Performed by: Herve Cifuentes CRNA  Authorized by: Magdaleno Law MD     Intubation:     Induction:  Intravenous    Intubated:  Postinduction    Mask Ventilation:  Easy mask    Attempts:  1    Attempted By:  CRNA    Method of Intubation:  Direct    Blade:  Anthony 4    Laryngeal View Grade: Grade IIb - only the arytenoids and epiglottis seen      Difficult Airway Encountered?: No      Complications:  None    Airway Device:  Oral endotracheal tube    Airway Device Size:  7.0    Style/Cuff Inflation:  Cuffed    Inflation Amount (mL):  8    Tube secured:  20    Secured at:  The lips    Placement Verified By:  Capnometry    Complicating Factors:  Anterior larynx and small mouth    Findings Post-Intubation:  BS equal bilateral

## 2022-07-16 NOTE — SUBJECTIVE & OBJECTIVE
Interval History: Pt resting in bed. Daughter at bedside. Denies any needs. Has some R hip tenderness with movement- as expected. Denies any CP, SOB, abd pain. Plans for surgical intervention today. Verbalizes understanding.     Review of Systems   Constitutional:  Positive for activity change.   HENT:  Negative for postnasal drip.    Respiratory:  Negative for cough and shortness of breath.    Cardiovascular:  Negative for chest pain.   Gastrointestinal:  Negative for abdominal distention, diarrhea and nausea.   Musculoskeletal:  Positive for arthralgias, gait problem and myalgias.   All other systems reviewed and are negative.  Objective:     Vital Signs (Most Recent):  Temp: 97.9 °F (36.6 °C) (07/16/22 0800)  Pulse: (!) 57 (07/16/22 0800)  Resp: 18 (07/16/22 0800)  BP: (!) 108/46 (07/16/22 0800)  SpO2: (!) 93 % (07/16/22 0800)   Vital Signs (24h Range):  Temp:  [97.8 °F (36.6 °C)-98.4 °F (36.9 °C)] 97.9 °F (36.6 °C)  Pulse:  [56-70] 57  Resp:  [16-18] 18  SpO2:  [93 %-98 %] 93 %  BP: (108-150)/(44-55) 108/46     Weight: 47.3 kg (104 lb 4.4 oz)  Body mass index is 21.06 kg/m².  No intake or output data in the 24 hours ending 07/16/22 1026   Physical Exam  Vitals and nursing note reviewed.   Constitutional:       Appearance: Normal appearance.   HENT:      Head: Normocephalic.      Mouth/Throat:      Mouth: Mucous membranes are moist.   Eyes:      Pupils: Pupils are equal, round, and reactive to light.   Cardiovascular:      Rate and Rhythm: Normal rate and regular rhythm.      Pulses: Normal pulses.      Heart sounds: Normal heart sounds.   Pulmonary:      Effort: Pulmonary effort is normal.      Breath sounds: Normal breath sounds.   Abdominal:      General: Bowel sounds are normal. There is no distension.      Palpations: Abdomen is soft.      Tenderness: There is no abdominal tenderness.   Musculoskeletal:         General: Normal range of motion.      Cervical back: Neck supple.        Legs:       Comments: R  hipwith tenderness- distal pulses palpable with appropriate sensation      Skin:     General: Skin is warm and dry.      Capillary Refill: Capillary refill takes less than 2 seconds.   Neurological:      Mental Status: She is alert and oriented to person, place, and time. Mental status is at baseline.      GCS: GCS eye subscore is 4. GCS verbal subscore is 5. GCS motor subscore is 6.      Sensory: Sensation is intact.      Motor: Motor function is intact.   Psychiatric:         Mood and Affect: Mood normal.         Behavior: Behavior normal.       Significant Labs: All pertinent labs within the past 24 hours have been reviewed.  Recent Lab Results         07/16/22  0352   07/15/22  1727   07/15/22  1606   07/15/22  1600        Influenza B, Molecular   Negative           Anion Gap 8       10       aPTT       30.2       Baso # 0.02       0.02       Basophil % 0.5       0.3       BUN 20       21       BUN/CREAT RATIO 26       21       Calcium 8.3       8.6       Chloride 107       105       CO2 28       28       COVID-19 Ag   Negative           Creatinine 0.76       1.01       Differential Type Auto       Auto       eGFR if non  77       56       Eos # 0.22       0.13       Eosinophil % 5.3       1.9       Glucose 125       101       Group & Rh     O POS         Hematocrit 33.0       36.3       Hemoglobin 10.9       11.9       Immature Grans (Abs) 0.01       0.01       Immature Granulocytes 0.2       0.1       INDIRECT MERYL     NEG         Influenza A   Negative           INR       1.01       Lymph # 1.36       1.31       Lymph % 32.5       19.0       Magnesium 2.0             MCH 29.9       29.7       MCHC 33.0       32.8       MCV 90.7       90.5       Mono # 0.49       0.45       Mono % 11.7       6.5       MPV 8.7       8.4       Neutrophils, Abs 2.08       4.96       Neutrophils Relative 49.8       72.2       nRBC 0.0       0.0       NUCLEATED RBC ABSOLUTE 0.00       0.00       Platelets 269        289       Potassium 3.7       4.1       Protime       12.9       RBC 3.64       4.01       RDW 13.7       13.4       Sodium 139       139       TSH 3.060             WBC 4.18       6.88               Significant Imaging: I have reviewed all pertinent imaging results/findings within the past 24 hours.

## 2022-07-16 NOTE — CONSULTS
[address]  Orthopedics  Consult Note    Patient Name: Anibal Fermin  MRN: [id number]  Admission Date: 7/15/2022  Hospital Length of Stay: 0 days  Attending Provider: Toribio Baker MD  Primary Care Provider: Brandan Brandon DO (Inactive)    Patient information was obtained from patient and ER records.     Consults  Subjective:     Principal Problem:Fracture of femur    Chief Complaint:   Chief Complaint   Patient presents with    Hip Pain        HPI: 82-year-old female who fell 6 days ago went to her nurse practitioner was noted on x-ray did not have a fracture continued to have pain and hip went to the ER today noted to have a fracture of the femoral neck after CT was obtained she has now been admitted to hospitalist for medical evaluation her right lower extremity she does move her toes she does have sensation to touch has palpable pulses tender over greater trochanter of her hip pain on attempted motion the hip x-rays show she has a fracture of the femoral neck it is impacted slightly displaced will plan on doing insight 2 pinning of the right hip if it displaces will plan on the prosthesis of the hip      Past Medical History:   Diagnosis Date    Arthritis     COPD (chronic obstructive pulmonary disease)     Digestive disorder     Thyroid disease        Past Surgical History:   Procedure Laterality Date    ABDOMINAL SURGERY      HYSTERECTOMY         Review of patient's allergies indicates:  No Known Allergies    Current Facility-Administered Medications   Medication    acetaminophen tablet 650 mg    aluminum-magnesium hydroxide-simethicone 200-200-20 mg/5 mL suspension 30 mL    dextrose 5 % and 0.9 % NaCl infusion    dextrose 50% injection 12.5 g    dextrose 50% injection 25 g    [START ON 7/16/2022] docusate sodium capsule 100 mg    gabapentin capsule 300 mg    glucagon (human recombinant) injection 1 mg    HYDROcodone-acetaminophen 5-325 mg per tablet 1  "tablet    [START ON 7/16/2022] levothyroxine tablet 50 mcg    melatonin tablet 6 mg    morphine injection 4 mg    mupirocin 2 % ointment    naloxone 0.4 mg/mL injection 0.02 mg    ondansetron injection 4 mg    ondansetron injection 4 mg    [START ON 7/16/2022] pantoprazole EC tablet 40 mg    [START ON 7/16/2022] polyethylene glycol packet 17 g    simethicone chewable tablet 80 mg    sodium chloride 0.9% flush 10 mL    tiZANidine tablet 4 mg     Family History    None       Tobacco Use    Smoking status: Never Smoker    Smokeless tobacco: Never Used   Substance and Sexual Activity    Alcohol use: Never    Drug use: Never    Sexual activity: Not on file     Review of Systems   Musculoskeletal:  Positive for joint pain.   Objective:     Vital Signs (Most Recent):  Temp: 97.8 °F (36.6 °C) (07/15/22 1918)  Pulse: 62 (07/15/22 1918)  Resp: 16 (07/15/22 1918)  BP: (!) 150/53 (07/15/22 1918)  SpO2: 96 % (07/15/22 1918)   Vital Signs (24h Range):  Temp:  [97.8 °F (36.6 °C)] 97.8 °F (36.6 °C)  Pulse:  [62-70] 62  Resp:  [16-18] 16  SpO2:  [96 %-98 %] 96 %  BP: (149-150)/(50-53) 150/53        Height: 4' 11" (149.9 cm)  Body mass index is 23.43 kg/m².    No intake or output data in the 24 hours ending 07/15/22 2201                Right Hip Exam     Tenderness   The patient tender to palpation of the trochanteric bursa.    Other   Sensation: normal      Vascular Exam     Right Pulses  Dorsalis Pedis:      1+          Significant Labs: All pertinent labs within the past 24 hours have been reviewed.    Significant Imaging: I have reviewed all pertinent imaging results/findings.    Assessment/Plan:     No notes have been filed under this hospital service.  Service: Orthopedic Surgery      Thank you for your consult. Pinning right hip    Gilberto Moraes MD  Orthopedics  [facility] - [address] [place] [address]      "

## 2022-07-17 LAB
ALBUMIN SERPL BCP-MCNC: 2.2 G/DL (ref 3.5–5)
ALBUMIN/GLOB SERPL: 0.9 {RATIO}
ALP SERPL-CCNC: 171 U/L (ref 55–142)
ALT SERPL W P-5'-P-CCNC: 32 U/L (ref 13–56)
ANION GAP SERPL CALCULATED.3IONS-SCNC: 10 MMOL/L (ref 7–16)
AST SERPL W P-5'-P-CCNC: 30 U/L (ref 15–37)
BASOPHILS # BLD AUTO: 0.01 K/UL (ref 0–0.2)
BASOPHILS NFR BLD AUTO: 0.1 % (ref 0–1)
BILIRUB SERPL-MCNC: 0.2 MG/DL (ref 0–1.2)
BUN SERPL-MCNC: 17 MG/DL (ref 7–18)
BUN/CREAT SERPL: 23 (ref 6–20)
CALCIUM SERPL-MCNC: 8.2 MG/DL (ref 8.5–10.1)
CHLORIDE SERPL-SCNC: 106 MMOL/L (ref 98–107)
CO2 SERPL-SCNC: 27 MMOL/L (ref 21–32)
CREAT SERPL-MCNC: 0.75 MG/DL (ref 0.55–1.02)
DIFFERENTIAL METHOD BLD: ABNORMAL
EOSINOPHIL # BLD AUTO: 0 K/UL (ref 0–0.5)
EOSINOPHIL NFR BLD AUTO: 0 % (ref 1–4)
ERYTHROCYTE [DISTWIDTH] IN BLOOD BY AUTOMATED COUNT: 13.5 % (ref 11.5–14.5)
GLOBULIN SER-MCNC: 2.4 G/DL (ref 2–4)
GLUCOSE SERPL-MCNC: 123 MG/DL (ref 74–106)
HCT VFR BLD AUTO: 32.2 % (ref 38–47)
HGB BLD-MCNC: 10.6 G/DL (ref 12–16)
IMM GRANULOCYTES # BLD AUTO: 0.03 K/UL (ref 0–0.04)
IMM GRANULOCYTES NFR BLD: 0.4 % (ref 0–0.4)
LYMPHOCYTES # BLD AUTO: 1.06 K/UL (ref 1–4.8)
LYMPHOCYTES NFR BLD AUTO: 15 % (ref 27–41)
MAGNESIUM SERPL-MCNC: 1.9 MG/DL (ref 1.7–2.3)
MCH RBC QN AUTO: 29.7 PG (ref 27–31)
MCHC RBC AUTO-ENTMCNC: 32.9 G/DL (ref 32–36)
MCV RBC AUTO: 90.2 FL (ref 80–96)
MONOCYTES # BLD AUTO: 0.65 K/UL (ref 0–0.8)
MONOCYTES NFR BLD AUTO: 9.2 % (ref 2–6)
MPC BLD CALC-MCNC: 8.9 FL (ref 9.4–12.4)
NEUTROPHILS # BLD AUTO: 5.31 K/UL (ref 1.8–7.7)
NEUTROPHILS NFR BLD AUTO: 75.3 % (ref 53–65)
NRBC # BLD AUTO: 0 X10E3/UL
NRBC, AUTO (.00): 0 %
PLATELET # BLD AUTO: 301 K/UL (ref 150–400)
POTASSIUM SERPL-SCNC: 4.2 MMOL/L (ref 3.5–5.1)
PROT SERPL-MCNC: 4.6 G/DL (ref 6.4–8.2)
RBC # BLD AUTO: 3.57 M/UL (ref 4.2–5.4)
SODIUM SERPL-SCNC: 139 MMOL/L (ref 136–145)
WBC # BLD AUTO: 7.06 K/UL (ref 4.5–11)

## 2022-07-17 PROCEDURE — 99232 SBSQ HOSP IP/OBS MODERATE 35: CPT | Mod: ,,, | Performed by: FAMILY MEDICINE

## 2022-07-17 PROCEDURE — 25000003 PHARM REV CODE 250: Performed by: HOSPITALIST

## 2022-07-17 PROCEDURE — 97165 OT EVAL LOW COMPLEX 30 MIN: CPT

## 2022-07-17 PROCEDURE — 80053 COMPREHEN METABOLIC PANEL: CPT | Performed by: ORTHOPAEDIC SURGERY

## 2022-07-17 PROCEDURE — 85025 COMPLETE CBC W/AUTO DIFF WBC: CPT | Performed by: NURSE PRACTITIONER

## 2022-07-17 PROCEDURE — 36415 COLL VENOUS BLD VENIPUNCTURE: CPT | Performed by: ORTHOPAEDIC SURGERY

## 2022-07-17 PROCEDURE — 63600175 PHARM REV CODE 636 W HCPCS: Performed by: ORTHOPAEDIC SURGERY

## 2022-07-17 PROCEDURE — 99232 PR SUBSEQUENT HOSPITAL CARE,LEVL II: ICD-10-PCS | Mod: ,,, | Performed by: FAMILY MEDICINE

## 2022-07-17 PROCEDURE — 83735 ASSAY OF MAGNESIUM: CPT | Performed by: NURSE PRACTITIONER

## 2022-07-17 PROCEDURE — 51798 US URINE CAPACITY MEASURE: CPT

## 2022-07-17 PROCEDURE — 25000003 PHARM REV CODE 250: Performed by: NURSE PRACTITIONER

## 2022-07-17 PROCEDURE — 11000001 HC ACUTE MED/SURG PRIVATE ROOM

## 2022-07-17 PROCEDURE — 97161 PT EVAL LOW COMPLEX 20 MIN: CPT

## 2022-07-17 PROCEDURE — 25000003 PHARM REV CODE 250: Performed by: ORTHOPAEDIC SURGERY

## 2022-07-17 RX ADMIN — HYDROCODONE BITARTRATE AND ACETAMINOPHEN 1 TABLET: 5; 325 TABLET ORAL at 12:07

## 2022-07-17 RX ADMIN — GABAPENTIN 300 MG: 300 CAPSULE ORAL at 10:07

## 2022-07-17 RX ADMIN — HYDROCODONE BITARTRATE AND ACETAMINOPHEN 1 TABLET: 5; 325 TABLET ORAL at 05:07

## 2022-07-17 RX ADMIN — MUPIROCIN 1 G: 20 OINTMENT TOPICAL at 09:07

## 2022-07-17 RX ADMIN — SODIUM CHLORIDE 75 ML/HR: 9 INJECTION, SOLUTION INTRAVENOUS at 05:07

## 2022-07-17 RX ADMIN — HYDROCODONE BITARTRATE AND ACETAMINOPHEN 1 TABLET: 5; 325 TABLET ORAL at 10:07

## 2022-07-17 RX ADMIN — MUPIROCIN: 20 OINTMENT TOPICAL at 09:07

## 2022-07-17 RX ADMIN — POLYETHYLENE GLYCOL 3350 17 G: 17 POWDER, FOR SOLUTION ORAL at 09:07

## 2022-07-17 RX ADMIN — LEVOTHYROXINE SODIUM 50 MCG: 50 TABLET ORAL at 05:07

## 2022-07-17 RX ADMIN — CEFAZOLIN SODIUM 2 G: 10 INJECTION, POWDER, FOR SOLUTION INTRAVENOUS at 03:07

## 2022-07-17 RX ADMIN — APIXABAN 2.5 MG: 2.5 TABLET, FILM COATED ORAL at 10:07

## 2022-07-17 RX ADMIN — GABAPENTIN 300 MG: 300 CAPSULE ORAL at 09:07

## 2022-07-17 RX ADMIN — TIZANIDINE 4 MG: 4 TABLET ORAL at 10:07

## 2022-07-17 RX ADMIN — MELATONIN 6 MG: at 10:07

## 2022-07-17 RX ADMIN — APIXABAN 2.5 MG: 2.5 TABLET, FILM COATED ORAL at 09:07

## 2022-07-17 RX ADMIN — ACETAMINOPHEN 650 MG: 325 TABLET ORAL at 09:07

## 2022-07-17 RX ADMIN — DOCUSATE SODIUM 100 MG: 100 CAPSULE, LIQUID FILLED ORAL at 09:07

## 2022-07-17 RX ADMIN — PANTOPRAZOLE SODIUM 40 MG: 40 TABLET, DELAYED RELEASE ORAL at 09:07

## 2022-07-17 NOTE — ASSESSMENT & PLAN NOTE
- acute  - comminuted impacted fracture of the right-sided femoral neck  - Dimitry consulted   - NPO p MN   - D5NS IVF  - EKG and CXR without acute processes   - continue home med- holding ASA  7/16  - OR today   7/17  - s/p pinning of femur  - PT/OT  - eliquis for DVT ppx   - SS consulted for assistance with placement

## 2022-07-17 NOTE — PT/OT/SLP EVAL
Physical Therapy Evaluation    Patient Name:  Anibal Fermin   MRN:  [id number]    Recommendations:     Discharge Recommendations:  nursing facility, skilled, rehabilitation facility   Discharge Equipment Recommendations: other (see comments) (to be determined)   Barriers to discharge: awaiting swing bed placement    Assessment:     Anibal Fermin is a 82 y.o. female admitted with a medical diagnosis of Fracture of femur.  She presents with the following impairments/functional limitations:  weakness, impaired endurance, impaired self care skills, impaired functional mobility, gait instability, impaired balance, decreased lower extremity function, pain, decreased ROM, orthopedic precautions .    Patient participated well with PT evaluation and demonstrated a good understanding of TTWB precaution after coaching. Patient did fatigue easily and will benefit from swing bed at d/c to safely progress mobility whilst monitoring adherence to TTWB restriction.    Rehab Prognosis: Good; patient would benefit from acute skilled PT services to address these deficits and reach maximum level of function.    Recent Surgery: Procedure(s) (LRB):  PINNING, HIP, PERCUTANEOUS (Right) 1 Day Post-Op    Plan:     During this hospitalization, patient to be seen daily to address the identified rehab impairments via gait training, therapeutic activities, therapeutic exercises and progress toward the following goals:    · Plan of Care Expires:  08/17/22    Subjective     Chief Complaint: right femur fracture  Patient/Family Comments/goals: Patient motivated to reagin independent function.  Pain/Comfort:  · Pain Rating 1: 3/10 (up to 6/10 with certain movements)  · Location - Side 1: Right  · Location 1: hip  · Pain Addressed 1: Pre-medicate for activity, Reposition, Distraction, Cessation of Activity  · Pain Rating Post-Intervention 1: 3/10    Patients cultural, spiritual, Taoist conflicts given the current situation: no    Living  Environment:  Pt lives alone in a single level home; one step to enter, one step to den  Prior to admission, patients level of function was independent with no AD.  Equipment used at home: none.  DME owned (not currently used): none.  Upon discharge, patient will have assistance from swing bed staff.    Objective:     Communicated with Amanda Marmolejo RN prior to session.  Patient found supine with peripheral IV, SCD, bed alarm  upon PT entry to room.    General Precautions: Standard, fall   Orthopedic Precautions:RLE toe touch weight bearing   Braces: N/A  Respiratory Status: Room air; O2 available at 2L/min but pt not wearing same.    Exams:  · Cognitive Exam:  Patient is oriented to Person, Place, Time and Situation  · Sensation:    · -       Intact  · RLE ROM: Deficits: hip flexion to 90 in sitting; knee/ankle WFL  · RLE Strength: Deficits: hip 3-/5; knee 4/5; ankle 5/5  · LLE ROM: WFL  · LLE Strength: 5/5    Functional Mobility:  · Bed Mobility:     · Supine to Sit: minimum assistance and verbal cues for sequencing/safety  · Transfers:     · Sit to Stand:  minimum assistance with rolling walker and TTWB right- verbal and tactile feedback for training  · Bed to Chair: minimum assistance with  rolling walker  using  Step Transfer and TTWB right  · Gait: 20' with RW, min A; verbal and tactile feedback for TTWB training; easy fatigue of UE's; step to pattern, short step lengths    Therapeutic Activities and Exercises:  ·  Pt educated on PT role/POC.   · Importance of OOB activity with staff assistance.  · Importance of sitting up in the chair throughout the day as tolerated, especially for meals   · Safety during functional t/f and mobility with use of RW, TTWB right LE  · White board updated   · Multiple self-care tasks/functional mobility completed- assistance level noted above   · All questions/concerns answered within PT scope of practice      AM-PAC 6 CLICK MOBILITY  Total Score:16     Patient left up in  chair with all lines intact, call button in reach, Amanda Marmolejo RN notified and daughter present.    GOALS:   Multidisciplinary Problems     Physical Therapy Goals        Problem: Physical Therapy    Goal Priority Disciplines Outcome Goal Variances Interventions   Physical Therapy Goal     PT, PT/OT Ongoing, Progressing     Description: Short Term Goals to be met by: 7/31/2022    Patient will increase functional independence and safety with mobility by performing    Supine to sit modified independent  Sit to stand modified independent with Rolling walker; Toe-touch weight-bearing: right LE  Gait x 100' modified independent with Rolling walker; Toe-touch weight-bearing: right LE  Patient with complete illustrated home exercise program x 30 reps with assist as needed.        Long Term Goal to be met by 9/17/2022    Patient will regain independent functional mobility with lowest level of assistive device to return to home situation and prior ADLs.                    History:     Past Medical History:   Diagnosis Date    Arthritis     COPD (chronic obstructive pulmonary disease)     Digestive disorder     Thyroid disease        Past Surgical History:   Procedure Laterality Date    ABDOMINAL SURGERY      HYSTERECTOMY         Time Tracking:     PT Received On: 07/17/22  PT Start Time: 0930     PT Stop Time: 1000  PT Total Time (min): 30 min     Billable Minutes: Evaluation Low compelxity      07/17/2022

## 2022-07-17 NOTE — PLAN OF CARE
Problem: Occupational Therapy  Goal: Occupational Therapy Goal  Description: STG:  Pt will perform grooming with setup  Pt will bathe with setup and CGA  Pt will perform UE dressing with setup  Pt will perform LE dressing with setup and CGA with AD if needed  Pt will sit EOB x 5 min with supervision assistance  Pt will transfer bed/chair/bsc with RW,  with CGA with TTWB to (R) LE  Pt will perform standing task x 4 min with CGA with RW, TTWB to (R) LE  Pt will tolerate 20 minutes of tx without fatigue      LT.Restore to max I with self care and mobility.     Outcome: Ongoing, Progressing   OT low complexity evaluation performed. OT will treat pt daily 5x/wk while hospitalized to address the above goals. Pt will need Swing bed at discharge to continue healing and improving her safety and functional mobility and self care prior to returning home alone.

## 2022-07-17 NOTE — PROGRESS NOTES
[facility] - [address] [place] [address] Medicine  Progress Note    Patient Name: Anibal Fermin  MRN: [id number]  Patient Class: IP- Inpatient   Admission Date: 7/15/2022  Length of Stay: 2 days  Attending Physician: Toribio Baker MD  Primary Care Provider: Brandan Brandon DO (Inactive)        Subjective:     Principal Problem:Fracture of femur        HPI:  Ms Fermin is an 83 yo female who presented to the ED with c/o R leg/hip pain that started after a fall a week ago. She was seen at an outside clinic and had imaging done that did not show a fracture. She was sent home. She has continued to have problems walking and continued pain. Imaging in the ED today revealed a comminuted impacted fracture of the right-sided femoral neck. She is being admitted to the hospitalist service with consult to orthopedics.     She has a PMH of hypothyroidism, osteoarthritis, neuropathy, and COPD. She denies any ETOH or smoking. She lives at home. She has not been vaccinated for covid; she has not had covid. She is considered a full code.       Overview/Hospital Course:  7/16: plans for surgical procedure today; will have PT/OT see and SS for d/c planning   7/17: s/p pinning of femur; PT/OT following; SS consulted; eliquis for dvt ppx       Interval History: Pt in bed. Daughter at bedside. PT in room for therapy. Pt states she had a rough night- pain got out of hand- discussed keeping it controlled today. Denies any CP, SOB, abd pain. SS consulted to assist with placement     Review of Systems   Constitutional:  Positive for activity change.   HENT:  Negative for postnasal drip.    Respiratory:  Negative for cough and shortness of breath.    Cardiovascular:  Negative for chest pain.   Gastrointestinal:  Negative for abdominal distention, diarrhea and nausea.   Musculoskeletal:  Positive for arthralgias, gait problem and myalgias.   All other systems reviewed and are negative.  Objective:     Vital Signs (Most  Recent):  Temp: 98 °F (36.7 °C) (07/17/22 0908)  Pulse: (!) 54 (07/17/22 0908)  Resp: 18 (07/17/22 0908)  BP: (!) 120/47 (07/17/22 0908)  SpO2: 95 % (07/17/22 0908)   Vital Signs (24h Range):  Temp:  [97.6 °F (36.4 °C)-99.2 °F (37.3 °C)] 98 °F (36.7 °C)  Pulse:  [54-83] 54  Resp:  [13-20] 18  SpO2:  [94 %-99 %] 95 %  BP: (118-155)/(45-76) 120/47     Weight: 50 kg (110 lb 3.7 oz)  Body mass index is 22.26 kg/m².    Intake/Output Summary (Last 24 hours) at 7/17/2022 1054  Last data filed at 7/17/2022 0535  Gross per 24 hour   Intake --   Output 1700 ml   Net -1700 ml      Physical Exam  Vitals and nursing note reviewed.   Constitutional:       Appearance: Normal appearance.   HENT:      Head: Normocephalic.      Mouth/Throat:      Mouth: Mucous membranes are moist.   Eyes:      Pupils: Pupils are equal, round, and reactive to light.   Cardiovascular:      Rate and Rhythm: Normal rate and regular rhythm.      Pulses: Normal pulses.      Heart sounds: Normal heart sounds.   Pulmonary:      Effort: Pulmonary effort is normal.      Breath sounds: Normal breath sounds.   Abdominal:      General: Bowel sounds are normal. There is no distension.      Palpations: Abdomen is soft.      Tenderness: There is no abdominal tenderness.   Musculoskeletal:         General: Normal range of motion.      Cervical back: Neck supple.        Legs:       Comments: R hip surgical dsg in place    Skin:     General: Skin is warm and dry.      Capillary Refill: Capillary refill takes less than 2 seconds.   Neurological:      Mental Status: She is alert and oriented to person, place, and time. Mental status is at baseline.      GCS: GCS eye subscore is 4. GCS verbal subscore is 5. GCS motor subscore is 6.      Sensory: Sensation is intact.      Motor: Motor function is intact.   Psychiatric:         Mood and Affect: Mood normal.         Behavior: Behavior normal.       Significant Labs: All pertinent labs within the past 24 hours have been  reviewed.  Recent Lab Results         07/17/22  0333   07/16/22  1143        Albumin/Globulin Ratio 0.9         Albumin 2.2         Alkaline Phosphatase 171         ALT 32         Anion Gap 10   13       AST 30         Baso # 0.01   0.02       Basophil % 0.1   0.3       BILIRUBIN TOTAL 0.2         BUN 17   20       BUN/CREAT RATIO 23   27       Calcium 8.2   7.9       Chloride 106   107       CO2 27   24       Creatinine 0.75   0.74       Differential Type Auto   Auto       eGFR if non  79   80       Eos # 0.00   0.21       Eosinophil % 0.0   3.7       Globulin, Total 2.4         Glucose 123   105       Hematocrit 32.2   33.8       Hemoglobin 10.6   10.8       Immature Grans (Abs) 0.03   0.02       Immature Granulocytes 0.4   0.3       Lymph # 1.06   1.19       Lymph % 15.0   20.7       Magnesium 1.9         MCH 29.7   29.9       MCHC 32.9   32.0       MCV 90.2   93.6       Mono # 0.65   0.47       Mono % 9.2   8.2       MPV 8.9   8.5       Neutrophils, Abs 5.31   3.83       Neutrophils Relative 75.3   66.8       nRBC 0.0   0.0       NUCLEATED RBC ABSOLUTE 0.00   0.00       Platelets 301   232       Potassium 4.2   4.3       PROTEIN TOTAL 4.6         RBC 3.57   3.61       RDW 13.5   13.5       Sodium 139   140       WBC 7.06   5.74               Significant Imaging: I have reviewed all pertinent imaging results/findings within the past 24 hours.      Assessment/Plan:      * Fracture of femur  - acute  - comminuted impacted fracture of the right-sided femoral neck  - Dimitry consulted   - NPO p MN   - D5NS IVF  - EKG and CXR without acute processes   - continue home med- holding ASA  7/16  - OR today   7/17  - s/p pinning of femur  - PT/OT  - eliquis for DVT ppx   - SS consulted for assistance with placement       Osteoarthritis        Neuropathy  - chronic, stable  - continue gabapentin       Hypothyroidism  - chronic, stable  - continue levothyroxine       GERD (gastroesophageal reflux disease)  -  chronic, stable  - continue PPI         VTE Risk Mitigation (From admission, onward)         Ordered     apixaban tablet 2.5 mg  2 times daily         07/16/22 1132     IP VTE HIGH RISK PATIENT  Once         07/16/22 1132     Place RENAY hose  Until discontinued         07/16/22 1132     Place sequential compression device  Until discontinued         07/16/22 1132     Place sequential compression device  Until discontinued         07/15/22 1732                Discharge Planning   YANI:      Code Status: Full Code   Is the patient medically ready for discharge?:     Reason for patient still in hospital (select all that apply): Treatment  Discharge Plan A: Rehab          QUAN Malcolm  Department of Hospital Medicine   [address]

## 2022-07-17 NOTE — CARE UPDATE
Patient awake alert without any new complaints.  Right lower extremity moves her toes dorsiflexion plantar flexion.  Sensation to touch.  Dressing intact.  Partial weight-bearing right hip.  She is awaiting rehab placement.  She is doing well.  No new complaints today.

## 2022-07-17 NOTE — PLAN OF CARE
[facility]  Initial Discharge Assessment       Primary Care Provider: Brandan Brandon DO (Inactive)    Admission Diagnosis: Preop examination [Z01.818]  Chest pain [R07.9]  Closed fracture of neck of right femur, initial encounter [S72.001A]    Admission Date: 7/15/2022  Expected Discharge Date:          Payor: MEDICARE / Plan: MEDICARE PART A & B / Product Type: Government /     Extended Emergency Contact Information  Primary Emergency Contact: YANET LAZARO  Mobile Phone: [phone]  Relation: Daughter  Preferred language: English   needed? No    Discharge Plan A: Rehab  Discharge Plan B: Skilled Nursing Facility      [facility] [place] [address]  [address]  [place] [address]  Phone: [phone] Fax: [phone]    [facility] [place]  [facility] [place] - [address]  [address]  [address]  [place] [address]  Phone: [phone] Fax: [phone]      Initial Assessment (most recent)     Adult Discharge Assessment - 07/17/22 0947        Discharge Assessment    Assessment Type Discharge Planning Assessment     Source of Information patient;family     Communicated YANI with patient/caregiver Date not available/Unable to determine     Lives With alone     Equipment Currently Used at Home none     Discharge Plan A Rehab     Discharge Plan B Skilled Nursing Facility               Received consult for discharge planning. Spoke with patient and her daughter in the room. Daughter is Yanet Lazaro [phone]. Prior to admit patient lived alone and had no home health and no equipment. States she has a walker and a cane but it was her husbands. Daughter lives in [place], [place]. They would like to see how patient does with therapy before making a decision on where to go for rehab but received a choice for [facility], [facility], and [facility] in [place]. Spoke with [facility] at [phone] to get fax number  [phone] and faxed referral. Also faxed referral to [facility] and [facility]. IM explained and signed by patient.  Dc planner will need to send PT/OT referrals when completed.

## 2022-07-17 NOTE — PLAN OF CARE
Problem: Airway Clearance Ineffective  Goal: Effective Airway Clearance  Outcome: Ongoing, Progressing

## 2022-07-17 NOTE — PLAN OF CARE
Problem: Physical Therapy  Goal: Physical Therapy Goal  Description: Short Term Goals to be met by: 7/31/2022    Patient will increase functional independence and safety with mobility by performing    Supine to sit modified independent  Sit to stand modified independent with Rolling walker; Toe-touch weight-bearing: right LE  Gait x 100' modified independent with Rolling walker; Toe-touch weight-bearing: right LE  Patient with complete illustrated home exercise program x 30 reps with assist as needed.        Long Term Goal to be met by 9/17/2022    Patient will regain independent functional mobility with lowest level of assistive device to return to home situation and prior ADLs.   Outcome: Ongoing, Progressing       Patient participated well with PT evaluation and demonstrated a good understanding of TTWB precaution after coaching. Patient did fatigue easily and will benefit from swing bed at d/c to safely progress mobility whilst monitoring adherence to TTWB restriction.

## 2022-07-17 NOTE — PT/OT/SLP EVAL
Occupational Therapy   Evaluation    Name: Anibal Fermin  MRN: [id number]  Admitting Diagnosis:  Fracture of femur  Recent Surgery: Procedure(s) (LRB):  PINNING, HIP, PERCUTANEOUS (Right) 1 Day Post-Op    Recommendations:     Discharge Recommendations: nursing facility, skilled, rehabilitation facility  Discharge Equipment Recommendations:  walker, rolling, 3-in-1 commode, wheelchair  Barriers to discharge:       Assessment:     Anibal Fermin is a 82 y.o. female with a medical diagnosis of Fracture of femur.  She presents with alert, c/o hip pain and feeling uncomfortable in the bed. Performance deficits affecting function: weakness, impaired balance, impaired endurance, impaired functional mobility, impaired self care skills, pain, orthopedic precautions.      Rehab Prognosis: Good; patient would benefit from acute skilled OT services to address these deficits and reach maximum level of function.       Plan:     Patient to be seen 5 x/week to address the above listed problems via self-care/home management, therapeutic activities, therapeutic exercises  · Plan of Care Expires: 08/09/22  · Plan of Care Reviewed with: patient, daughter    Subjective     Chief Complaint: Pt c/o hip pain and pain from being in the bed and unable to reposition well  Patient/Family Comments/goals: Pt and family want for pt to go to [facility] at D/C    Occupational Profile:  Living Environment: Pt lives at home alone with 1 step in the house.  Previous level of function: Pt was independent with all her ADLs/ IADLs  Roles and Routines: Pt is , takes care of her self and her home  Equipment Used at Home:  none  Assistance upon Discharge: Pt will have staff assistance at [facility] or inpt rehab    Pain/Comfort:  · Pain Rating 1: 3/10 (up to 6/10 with certain movements)  · Location - Side 1: Right  · Location 1: hip  · Pain Addressed 1: Pre-medicate for activity, Reposition, Distraction, Cessation of Activity  · Pain Rating Post-Intervention 1:  3/10    Patients cultural, spiritual, Tenriism conflicts given the current situation:      Objective:     Communicated with: JESSICA Marmolejo prior to session.  Patient found HOB elevated with peripheral IV, SCD, bed alarm upon OT entry to room.    General Precautions: Standard, fall   Orthopedic Precautions:RLE toe touch weight bearing   Braces: N/A  Respiratory Status: Nasal cannula, flow 2 L/min    Occupational Performance:    Bed Mobility:    · Patient completed Scooting/Bridging with contact guard assistance  · Patient completed Supine to Sit with minimum assistance  · Patient completed Sit to Supine with minimum assistance    Functional Mobility/Transfers:  · Patient completed Sit <> Stand Transfer with minimum assistance  with  rolling walker   · Patient completed Bed <> Chair Transfer using Step Transfer technique with minimum assistance with rolling walker (R) LE TTWB  · Functional Mobility: Pt demonstrated good adherence to TTWB with (R) LE with RW    Activities of Daily Living:  · Upper Body Dressing: minimum assistance    · Lower Body Dressing: maximal assistance      Cognitive/Visual Perceptual:  Cognitive/Psychosocial Skills:     -       Oriented to: Person, Place and Situation   -       Follows Commands/attention:Follows two-step commands  -       Communication: clear/fluent  -       Safety awareness/insight to disability: intact   -       Mood/Affect/Coping skills/emotional control: Appropriate to situation, Cooperative and Pleasant    Physical Exam:  Edema:  None noted  Sensation:    -       Intact  Dominant hand:    -       Left  Upper Extremity Range of Motion:     -       Right Upper Extremity: WFL  -       Left Upper Extremity: WFL  Upper Extremity Strength:    -       Right Upper Extremity: WFL  -       Left Upper Extremity: WFL   Strength:    -       Right Upper Extremity: WFL  -       Left Upper Extremity: WNL  Fine Motor Coordination:    -       Intact  Gross motor coordination:    WFL    AMPA 6 Click ADL:  AMPAC Total Score: 17    Treatment & Education:  · Pt educated on OT role/POC.   · Importance of OOB activity with staff assistance.  · Importance of sitting up in the chair throughout the day as tolerated, especially for meals   · Safety during functional t/f and mobility with use of RW  · Importance of assisting with self-care activities   · All questions/concerns answered within OT scope of practice    Education:    Patient left up in chair with call button in reach, RN notified and family present    GOALS:   Multidisciplinary Problems     Occupational Therapy Goals        Problem: Occupational Therapy    Goal Priority Disciplines Outcome Interventions   Occupational Therapy Goal     OT, PT/OT Ongoing, Progressing    Description: STG:  Pt will perform grooming with setup  Pt will bathe with setup and CGA  Pt will perform UE dressing with setup  Pt will perform LE dressing with setup and CGA with AD if needed  Pt will sit EOB x 5 min with supervision assistance  Pt will transfer bed/chair/bsc with RW,  with CGA with TTWB to (R) LE  Pt will perform standing task x 4 min with CGA with RW, TTWB to (R) LE  Pt will tolerate 20 minutes of tx without fatigue      LT.Restore to max I with self care and mobility.                      History:     Past Medical History:   Diagnosis Date    Arthritis     COPD (chronic obstructive pulmonary disease)     Digestive disorder     Thyroid disease        Past Surgical History:   Procedure Laterality Date    ABDOMINAL SURGERY      HYSTERECTOMY         Time Tracking:     OT Date of Treatment: 22  OT Start Time: 930  OT Stop Time: 1000  OT Total Time (min): 30 min    Billable Minutes:Evaluation low complexity    2022

## 2022-07-17 NOTE — SUBJECTIVE & OBJECTIVE
Interval History: Pt in bed. Daughter at bedside. PT in room for therapy. Pt states she had a rough night- pain got out of hand- discussed keeping it controlled today. Denies any CP, SOB, abd pain. SS consulted to assist with placement     Review of Systems   Constitutional:  Positive for activity change.   HENT:  Negative for postnasal drip.    Respiratory:  Negative for cough and shortness of breath.    Cardiovascular:  Negative for chest pain.   Gastrointestinal:  Negative for abdominal distention, diarrhea and nausea.   Musculoskeletal:  Positive for arthralgias, gait problem and myalgias.   All other systems reviewed and are negative.  Objective:     Vital Signs (Most Recent):  Temp: 98 °F (36.7 °C) (07/17/22 0908)  Pulse: (!) 54 (07/17/22 0908)  Resp: 18 (07/17/22 0908)  BP: (!) 120/47 (07/17/22 0908)  SpO2: 95 % (07/17/22 0908)   Vital Signs (24h Range):  Temp:  [97.6 °F (36.4 °C)-99.2 °F (37.3 °C)] 98 °F (36.7 °C)  Pulse:  [54-83] 54  Resp:  [13-20] 18  SpO2:  [94 %-99 %] 95 %  BP: (118-155)/(45-76) 120/47     Weight: 50 kg (110 lb 3.7 oz)  Body mass index is 22.26 kg/m².    Intake/Output Summary (Last 24 hours) at 7/17/2022 1054  Last data filed at 7/17/2022 0535  Gross per 24 hour   Intake --   Output 1700 ml   Net -1700 ml      Physical Exam  Vitals and nursing note reviewed.   Constitutional:       Appearance: Normal appearance.   HENT:      Head: Normocephalic.      Mouth/Throat:      Mouth: Mucous membranes are moist.   Eyes:      Pupils: Pupils are equal, round, and reactive to light.   Cardiovascular:      Rate and Rhythm: Normal rate and regular rhythm.      Pulses: Normal pulses.      Heart sounds: Normal heart sounds.   Pulmonary:      Effort: Pulmonary effort is normal.      Breath sounds: Normal breath sounds.   Abdominal:      General: Bowel sounds are normal. There is no distension.      Palpations: Abdomen is soft.      Tenderness: There is no abdominal tenderness.   Musculoskeletal:          General: Normal range of motion.      Cervical back: Neck supple.        Legs:       Comments: R hip surgical dsg in place    Skin:     General: Skin is warm and dry.      Capillary Refill: Capillary refill takes less than 2 seconds.   Neurological:      Mental Status: She is alert and oriented to person, place, and time. Mental status is at baseline.      GCS: GCS eye subscore is 4. GCS verbal subscore is 5. GCS motor subscore is 6.      Sensory: Sensation is intact.      Motor: Motor function is intact.   Psychiatric:         Mood and Affect: Mood normal.         Behavior: Behavior normal.       Significant Labs: All pertinent labs within the past 24 hours have been reviewed.  Recent Lab Results         07/17/22  0333   07/16/22  1143        Albumin/Globulin Ratio 0.9         Albumin 2.2         Alkaline Phosphatase 171         ALT 32         Anion Gap 10   13       AST 30         Baso # 0.01   0.02       Basophil % 0.1   0.3       BILIRUBIN TOTAL 0.2         BUN 17   20       BUN/CREAT RATIO 23   27       Calcium 8.2   7.9       Chloride 106   107       CO2 27   24       Creatinine 0.75   0.74       Differential Type Auto   Auto       eGFR if non  79   80       Eos # 0.00   0.21       Eosinophil % 0.0   3.7       Globulin, Total 2.4         Glucose 123   105       Hematocrit 32.2   33.8       Hemoglobin 10.6   10.8       Immature Grans (Abs) 0.03   0.02       Immature Granulocytes 0.4   0.3       Lymph # 1.06   1.19       Lymph % 15.0   20.7       Magnesium 1.9         MCH 29.7   29.9       MCHC 32.9   32.0       MCV 90.2   93.6       Mono # 0.65   0.47       Mono % 9.2   8.2       MPV 8.9   8.5       Neutrophils, Abs 5.31   3.83       Neutrophils Relative 75.3   66.8       nRBC 0.0   0.0       NUCLEATED RBC ABSOLUTE 0.00   0.00       Platelets 301   232       Potassium 4.2   4.3       PROTEIN TOTAL 4.6         RBC 3.57   3.61       RDW 13.5   13.5       Sodium 139   140       WBC 7.06   5.74                Significant Imaging: I have reviewed all pertinent imaging results/findings within the past 24 hours.

## 2022-07-18 VITALS
HEIGHT: 59 IN | TEMPERATURE: 98 F | WEIGHT: 110.25 LBS | DIASTOLIC BLOOD PRESSURE: 56 MMHG | HEART RATE: 73 BPM | OXYGEN SATURATION: 95 % | SYSTOLIC BLOOD PRESSURE: 150 MMHG | BODY MASS INDEX: 22.23 KG/M2 | RESPIRATION RATE: 19 BRPM

## 2022-07-18 LAB
ALBUMIN SERPL BCP-MCNC: 2 G/DL (ref 3.5–5)
ALBUMIN/GLOB SERPL: 0.9 {RATIO}
ALP SERPL-CCNC: 139 U/L (ref 55–142)
ALT SERPL W P-5'-P-CCNC: 18 U/L (ref 13–56)
ANION GAP SERPL CALCULATED.3IONS-SCNC: 9 MMOL/L (ref 7–16)
AST SERPL W P-5'-P-CCNC: 16 U/L (ref 15–37)
BASOPHILS # BLD AUTO: 0.04 K/UL (ref 0–0.2)
BASOPHILS NFR BLD AUTO: 0.8 % (ref 0–1)
BILIRUB SERPL-MCNC: 0.1 MG/DL (ref 0–1.2)
BUN SERPL-MCNC: 26 MG/DL (ref 7–18)
BUN/CREAT SERPL: 39 (ref 6–20)
CALCIUM SERPL-MCNC: 8 MG/DL (ref 8.5–10.1)
CHLORIDE SERPL-SCNC: 109 MMOL/L (ref 98–107)
CO2 SERPL-SCNC: 26 MMOL/L (ref 21–32)
CREAT SERPL-MCNC: 0.66 MG/DL (ref 0.55–1.02)
DIFFERENTIAL METHOD BLD: ABNORMAL
EOSINOPHIL # BLD AUTO: 0.29 K/UL (ref 0–0.5)
EOSINOPHIL NFR BLD AUTO: 5.5 % (ref 1–4)
ERYTHROCYTE [DISTWIDTH] IN BLOOD BY AUTOMATED COUNT: 13.9 % (ref 11.5–14.5)
GLOBULIN SER-MCNC: 2.2 G/DL (ref 2–4)
GLUCOSE SERPL-MCNC: 109 MG/DL (ref 74–106)
HCT VFR BLD AUTO: 29 % (ref 38–47)
HGB BLD-MCNC: 9.5 G/DL (ref 12–16)
IMM GRANULOCYTES # BLD AUTO: 0.02 K/UL (ref 0–0.04)
IMM GRANULOCYTES NFR BLD: 0.4 % (ref 0–0.4)
LYMPHOCYTES # BLD AUTO: 1.75 K/UL (ref 1–4.8)
LYMPHOCYTES NFR BLD AUTO: 33.5 % (ref 27–41)
MAGNESIUM SERPL-MCNC: 1.8 MG/DL (ref 1.7–2.3)
MCH RBC QN AUTO: 30.4 PG (ref 27–31)
MCHC RBC AUTO-ENTMCNC: 32.8 G/DL (ref 32–36)
MCV RBC AUTO: 92.7 FL (ref 80–96)
MONOCYTES # BLD AUTO: 0.43 K/UL (ref 0–0.8)
MONOCYTES NFR BLD AUTO: 8.2 % (ref 2–6)
MPC BLD CALC-MCNC: 8.9 FL (ref 9.4–12.4)
NEUTROPHILS # BLD AUTO: 2.7 K/UL (ref 1.8–7.7)
NEUTROPHILS NFR BLD AUTO: 51.6 % (ref 53–65)
NRBC # BLD AUTO: 0 X10E3/UL
NRBC, AUTO (.00): 0 %
PLATELET # BLD AUTO: 277 K/UL (ref 150–400)
POTASSIUM SERPL-SCNC: 4.4 MMOL/L (ref 3.5–5.1)
PROT SERPL-MCNC: 4.2 G/DL (ref 6.4–8.2)
RBC # BLD AUTO: 3.13 M/UL (ref 4.2–5.4)
SODIUM SERPL-SCNC: 140 MMOL/L (ref 136–145)
WBC # BLD AUTO: 5.23 K/UL (ref 4.5–11)

## 2022-07-18 PROCEDURE — 99239 PR HOSPITAL DISCHARGE DAY,>30 MIN: ICD-10-PCS | Mod: ,,, | Performed by: HOSPITALIST

## 2022-07-18 PROCEDURE — 25000003 PHARM REV CODE 250: Performed by: NURSE PRACTITIONER

## 2022-07-18 PROCEDURE — 36415 COLL VENOUS BLD VENIPUNCTURE: CPT | Performed by: ORTHOPAEDIC SURGERY

## 2022-07-18 PROCEDURE — 27000221 HC OXYGEN, UP TO 24 HOURS

## 2022-07-18 PROCEDURE — 85025 COMPLETE CBC W/AUTO DIFF WBC: CPT | Performed by: NURSE PRACTITIONER

## 2022-07-18 PROCEDURE — 99499 UNLISTED E&M SERVICE: CPT | Mod: ,,, | Performed by: HOSPITALIST

## 2022-07-18 PROCEDURE — 80053 COMPREHEN METABOLIC PANEL: CPT | Performed by: ORTHOPAEDIC SURGERY

## 2022-07-18 PROCEDURE — 25000003 PHARM REV CODE 250: Performed by: ORTHOPAEDIC SURGERY

## 2022-07-18 PROCEDURE — 97535 SELF CARE MNGMENT TRAINING: CPT

## 2022-07-18 PROCEDURE — 99499 NO LOS: ICD-10-PCS | Mod: ,,, | Performed by: HOSPITALIST

## 2022-07-18 PROCEDURE — 83735 ASSAY OF MAGNESIUM: CPT | Performed by: NURSE PRACTITIONER

## 2022-07-18 PROCEDURE — 99239 HOSP IP/OBS DSCHRG MGMT >30: CPT | Mod: ,,, | Performed by: HOSPITALIST

## 2022-07-18 PROCEDURE — 94761 N-INVAS EAR/PLS OXIMETRY MLT: CPT

## 2022-07-18 RX ORDER — LEVOTHYROXINE SODIUM 50 UG/1
50 TABLET ORAL
Qty: 30 TABLET | Refills: 0 | Status: SHIPPED | OUTPATIENT
Start: 2022-07-18 | End: 2022-08-17

## 2022-07-18 RX ORDER — GABAPENTIN 300 MG/1
300 CAPSULE ORAL 2 TIMES DAILY
Qty: 60 CAPSULE | Refills: 0 | Status: SHIPPED | OUTPATIENT
Start: 2022-07-18 | End: 2022-08-17

## 2022-07-18 RX ORDER — HYDROCODONE BITARTRATE AND ACETAMINOPHEN 5; 325 MG/1; MG/1
1 TABLET ORAL EVERY 6 HOURS PRN
Qty: 20 TABLET | Refills: 0 | Status: SHIPPED | OUTPATIENT
Start: 2022-07-18 | End: 2022-08-01

## 2022-07-18 RX ADMIN — LEVOTHYROXINE SODIUM 50 MCG: 50 TABLET ORAL at 06:07

## 2022-07-18 RX ADMIN — DOCUSATE SODIUM 100 MG: 100 CAPSULE, LIQUID FILLED ORAL at 09:07

## 2022-07-18 RX ADMIN — APIXABAN 2.5 MG: 2.5 TABLET, FILM COATED ORAL at 09:07

## 2022-07-18 RX ADMIN — ACETAMINOPHEN 650 MG: 325 TABLET ORAL at 06:07

## 2022-07-18 RX ADMIN — POLYETHYLENE GLYCOL 3350 17 G: 17 POWDER, FOR SOLUTION ORAL at 09:07

## 2022-07-18 RX ADMIN — PANTOPRAZOLE SODIUM 40 MG: 40 TABLET, DELAYED RELEASE ORAL at 09:07

## 2022-07-18 RX ADMIN — HYDROCODONE BITARTRATE AND ACETAMINOPHEN 1 TABLET: 5; 325 TABLET ORAL at 12:07

## 2022-07-18 RX ADMIN — GABAPENTIN 300 MG: 300 CAPSULE ORAL at 09:07

## 2022-07-18 NOTE — DISCHARGE INSTRUCTIONS
*Keep dressing dry and intact, do not remove dressing, if dressing becomes wet or bloody notify home health staff.  Home Health/swingbed staff will remove your drain (if you have one) on post op day #2 and change your dressing on post op day #3, give them that special dressing we sent home with you.  *Continue incentive spirometry at least every 2 hours while awake.  *Continue white stockings remove 2 times a day for 1 hour and replace.   *Continue ice pack to hip  *Take laxative of choice to have a bowel movement at least by tomorrow and then every other day.  *Increase fluids by mouth.  *Staples will be removed by home health/swingbed staff in 2 weeks from surgery prior to follow up appointment  *Continue pillows between legs for abduction  *Notify home health staff if any concerns.      -Restart Aspirin 81 mg by mouth daily AFTER completion of Eliquis.

## 2022-07-18 NOTE — PLAN OF CARE
[facility] Telemetry  Discharge Final Note    Primary Care Provider: Brandan Brandon DO (Inactive)    Expected Discharge Date: 7/18/2022    Final Discharge Note (most recent)     Final Note - 07/18/22 1332        Final Note    Assessment Type Final Discharge Note     Anticipated Discharge Disposition Rehab Facility     What phone number can be called within the next 1-3 days to see how you are doing after discharge? [phone]        Post-Acute Status    Post-Acute Authorization Placement     Post-Acute Placement Status Set-up Complete/Auth obtained     Patient choice form signed by patient/caregiver List with quality metrics by geographic area provided;List from System Post-Acute Care;List from CMS Compare     Discharge Delays None known at this time               SS spoke with Leilani from [facility] and patient has been accepted and can go today. MD notified. SS spoke with patient and her daughter, Yanet. Patient and her daughter are agreeable to going to [facility] today, but they are considering her going to a swb/snf in [place] after discharge from [facility]. I provided some SNF options in [place]. Patient's duaghter is going to look into them and think about it. Patient to discharge to [facility] today. Packet left at nurse's station.     Important Message from Medicare  Important Message from Medicare regarding Discharge Appeal Rights: Given to patient/caregiver, Explained to patient/caregiver, Signed/date by patient/caregiver     Date IMM was signed: 07/18/22  Time IMM was signed: 1322    Contact Info     Gilberto Moraes MD   Specialty: Orthopedic Surgery    [address]   [address]  Gilberto Moraes Md, Orthopedic & Sports Medicine, [place] [address]   Phone: [phone]       Next Steps: Follow up in 3 week(s)    Instructions: For wound re-check

## 2022-07-18 NOTE — SUBJECTIVE & OBJECTIVE
Interval History: Pt up working with therapy during exam. Denies any CP, SOB, abd pain. Tolerating pain well. NV checks intact. SS working on SWB placement     Review of Systems   Constitutional:  Positive for activity change.   HENT:  Negative for postnasal drip.    Respiratory:  Negative for cough and shortness of breath.    Cardiovascular:  Negative for chest pain.   Gastrointestinal:  Negative for abdominal distention, diarrhea and nausea.   Musculoskeletal:  Positive for arthralgias, gait problem and myalgias.   All other systems reviewed and are negative.  Objective:     Vital Signs (Most Recent):  Temp: 97.7 °F (36.5 °C) (07/18/22 1100)  Pulse: 63 (07/18/22 1100)  Resp: 18 (07/18/22 1100)  BP: (!) 142/48 (07/18/22 1100)  SpO2: 97 % (07/18/22 1100)   Vital Signs (24h Range):  Temp:  [97.5 °F (36.4 °C)-98.2 °F (36.8 °C)] 97.7 °F (36.5 °C)  Pulse:  [50-63] 63  Resp:  [16-20] 18  SpO2:  [93 %-98 %] 97 %  BP: (103-144)/(39-51) 142/48     Weight: 50 kg (110 lb 3.7 oz)  Body mass index is 22.26 kg/m².  No intake or output data in the 24 hours ending 07/18/22 1234   Physical Exam  Vitals and nursing note reviewed.   Constitutional:       Appearance: Normal appearance.   HENT:      Head: Normocephalic.      Mouth/Throat:      Mouth: Mucous membranes are moist.   Eyes:      Pupils: Pupils are equal, round, and reactive to light.   Cardiovascular:      Rate and Rhythm: Normal rate and regular rhythm.      Pulses: Normal pulses.      Heart sounds: Normal heart sounds.   Pulmonary:      Effort: Pulmonary effort is normal.      Breath sounds: Normal breath sounds.   Abdominal:      General: Bowel sounds are normal. There is no distension.      Palpations: Abdomen is soft.      Tenderness: There is no abdominal tenderness.   Musculoskeletal:         General: Normal range of motion.      Cervical back: Neck supple.        Legs:       Comments: R hip surgical dsg in place    Skin:     General: Skin is warm and dry.       Capillary Refill: Capillary refill takes less than 2 seconds.   Neurological:      Mental Status: She is alert and oriented to person, place, and time. Mental status is at baseline.      GCS: GCS eye subscore is 4. GCS verbal subscore is 5. GCS motor subscore is 6.      Sensory: Sensation is intact.      Motor: Motor function is intact.   Psychiatric:         Mood and Affect: Mood normal.         Behavior: Behavior normal.       Significant Labs: All pertinent labs within the past 24 hours have been reviewed.  Recent Lab Results         07/18/22  0321        Albumin/Globulin Ratio 0.9       Albumin 2.0       Alkaline Phosphatase 139       ALT 18       Anion Gap 9       AST 16       Baso # 0.04       Basophil % 0.8       BILIRUBIN TOTAL 0.1       BUN 26       BUN/CREAT RATIO 39       Calcium 8.0       Chloride 109       CO2 26       Creatinine 0.66       Differential Type Auto       eGFR if non  91       Eos # 0.29       Eosinophil % 5.5       Globulin, Total 2.2       Glucose 109       Hematocrit 29.0       Hemoglobin 9.5       Immature Grans (Abs) 0.02       Immature Granulocytes 0.4       Lymph # 1.75       Lymph % 33.5       Magnesium 1.8       MCH 30.4       MCHC 32.8       MCV 92.7       Mono # 0.43       Mono % 8.2       MPV 8.9       Neutrophils, Abs 2.70       Neutrophils Relative 51.6       nRBC 0.0       NUCLEATED RBC ABSOLUTE 0.00       Platelets 277       Potassium 4.4       PROTEIN TOTAL 4.2       RBC 3.13       RDW 13.9       Sodium 140       WBC 5.23               Significant Imaging: I have reviewed all pertinent imaging results/findings within the past 24 hours.

## 2022-07-18 NOTE — CARE UPDATE
Patient awake alert without any new complaints her dressing is clean and dry.  Neurovascular intact in right lower extremity.  Continue PT.  follow up in 3-4 weeks with x-rays.  DC staples in 2 weeks.

## 2022-07-18 NOTE — PROGRESS NOTES
[address] Medicine  Progress Note    Patient Name: Anibal Fermin  MRN: [id number]  Patient Class: IP- Inpatient   Admission Date: 7/15/2022  Length of Stay: 3 days  Attending Physician: Rizwan Orantes MD  Primary Care Provider: Brandan Brandon DO (Inactive)        Subjective:     Principal Problem:Fracture of femur        HPI:  Ms Fermin is an 83 yo female who presented to the ED with c/o R leg/hip pain that started after a fall a week ago. She was seen at an outside clinic and had imaging done that did not show a fracture. She was sent home. She has continued to have problems walking and continued pain. Imaging in the ED today revealed a comminuted impacted fracture of the right-sided femoral neck. She is being admitted to the hospitalist service with consult to orthopedics.     She has a PMH of hypothyroidism, osteoarthritis, neuropathy, and COPD. She denies any ETOH or smoking. She lives at home. She has not been vaccinated for covid; she has not had covid. She is considered a full code.       Overview/Hospital Course:  7/16: plans for surgical procedure today; will have PT/OT see and SS for d/c planning   7/17: s/p pinning of femur; PT/OT following; SS consulted; eliquis for dvt ppx   7/18: PT/OT; SWB pending; eliquis ongoing       Interval History: Pt up working with therapy during exam. Denies any CP, SOB, abd pain. Tolerating pain well. NV checks intact. SS working on SWB placement     Review of Systems   Constitutional:  Positive for activity change.   HENT:  Negative for postnasal drip.    Respiratory:  Negative for cough and shortness of breath.    Cardiovascular:  Negative for chest pain.   Gastrointestinal:  Negative for abdominal distention, diarrhea and nausea.   Musculoskeletal:  Positive for arthralgias, gait problem and myalgias.   All other systems reviewed and are negative.  Objective:     Vital Signs (Most Recent):  Temp: 97.7 °F (36.5 °C)  (07/18/22 1100)  Pulse: 63 (07/18/22 1100)  Resp: 18 (07/18/22 1100)  BP: (!) 142/48 (07/18/22 1100)  SpO2: 97 % (07/18/22 1100)   Vital Signs (24h Range):  Temp:  [97.5 °F (36.4 °C)-98.2 °F (36.8 °C)] 97.7 °F (36.5 °C)  Pulse:  [50-63] 63  Resp:  [16-20] 18  SpO2:  [93 %-98 %] 97 %  BP: (103-144)/(39-51) 142/48     Weight: 50 kg (110 lb 3.7 oz)  Body mass index is 22.26 kg/m².  No intake or output data in the 24 hours ending 07/18/22 1234   Physical Exam  Vitals and nursing note reviewed.   Constitutional:       Appearance: Normal appearance.   HENT:      Head: Normocephalic.      Mouth/Throat:      Mouth: Mucous membranes are moist.   Eyes:      Pupils: Pupils are equal, round, and reactive to light.   Cardiovascular:      Rate and Rhythm: Normal rate and regular rhythm.      Pulses: Normal pulses.      Heart sounds: Normal heart sounds.   Pulmonary:      Effort: Pulmonary effort is normal.      Breath sounds: Normal breath sounds.   Abdominal:      General: Bowel sounds are normal. There is no distension.      Palpations: Abdomen is soft.      Tenderness: There is no abdominal tenderness.   Musculoskeletal:         General: Normal range of motion.      Cervical back: Neck supple.        Legs:       Comments: R hip surgical dsg in place    Skin:     General: Skin is warm and dry.      Capillary Refill: Capillary refill takes less than 2 seconds.   Neurological:      Mental Status: She is alert and oriented to person, place, and time. Mental status is at baseline.      GCS: GCS eye subscore is 4. GCS verbal subscore is 5. GCS motor subscore is 6.      Sensory: Sensation is intact.      Motor: Motor function is intact.   Psychiatric:         Mood and Affect: Mood normal.         Behavior: Behavior normal.       Significant Labs: All pertinent labs within the past 24 hours have been reviewed.  Recent Lab Results         07/18/22  0321        Albumin/Globulin Ratio 0.9       Albumin 2.0       Alkaline Phosphatase  139       ALT 18       Anion Gap 9       AST 16       Baso # 0.04       Basophil % 0.8       BILIRUBIN TOTAL 0.1       BUN 26       BUN/CREAT RATIO 39       Calcium 8.0       Chloride 109       CO2 26       Creatinine 0.66       Differential Type Auto       eGFR if non  91       Eos # 0.29       Eosinophil % 5.5       Globulin, Total 2.2       Glucose 109       Hematocrit 29.0       Hemoglobin 9.5       Immature Grans (Abs) 0.02       Immature Granulocytes 0.4       Lymph # 1.75       Lymph % 33.5       Magnesium 1.8       MCH 30.4       MCHC 32.8       MCV 92.7       Mono # 0.43       Mono % 8.2       MPV 8.9       Neutrophils, Abs 2.70       Neutrophils Relative 51.6       nRBC 0.0       NUCLEATED RBC ABSOLUTE 0.00       Platelets 277       Potassium 4.4       PROTEIN TOTAL 4.2       RBC 3.13       RDW 13.9       Sodium 140       WBC 5.23               Significant Imaging: I have reviewed all pertinent imaging results/findings within the past 24 hours.      Assessment/Plan:      * Fracture of femur  - acute  - comminuted impacted fracture of the right-sided femoral neck  - Dimitry consulted   - NPO p MN   - D5NS IVF  - EKG and CXR without acute processes   - continue home med- holding ASA  7/16  - OR today   7/17  - s/p pinning of femur  - PT/OT  - eliquis for DVT ppx   - SS consulted for assistance with placement   7/18  - SWB pending   - eliquis   - PT/OT       Osteoarthritis        Neuropathy  - chronic, stable  - continue gabapentin       Hypothyroidism  - chronic, stable  - continue levothyroxine       GERD (gastroesophageal reflux disease)  - chronic, stable  - continue PPI         VTE Risk Mitigation (From admission, onward)         Ordered     apixaban tablet 2.5 mg  2 times daily         07/16/22 1132     IP VTE HIGH RISK PATIENT  Once         07/16/22 1132     Place RENAY hose  Until discontinued         07/16/22 1132     Place sequential compression device  Until discontinued          07/16/22 1132     Place sequential compression device  Until discontinued         07/15/22 1732                Discharge Planning   YANI: 7/18/2022     Code Status: Full Code   Is the patient medically ready for discharge?:     Reason for patient still in hospital (select all that apply): Treatment  Discharge Plan A: Rehab          QUAN Malcolm  Department of Hospital Medicine   [address]

## 2022-07-18 NOTE — ASSESSMENT & PLAN NOTE
- acute  - comminuted impacted fracture of the right-sided femoral neck  - Dimitry consulted   - NPO p MN   - D5NS IVF  - EKG and CXR without acute processes   - continue home med- holding ASA  7/16  - OR today   7/17  - s/p pinning of femur  - PT/OT  - eliquis for DVT ppx   - SS consulted for assistance with placement   7/18  - [facility]  - [facility]   - PT/OT

## 2022-07-18 NOTE — ASSESSMENT & PLAN NOTE
- acute  - comminuted impacted fracture of the right-sided femoral neck  - Dimitry consulted   - NPO p MN   - D5NS IVF  - EKG and CXR without acute processes   - continue home med- holding ASA  7/16  - OR today   7/17  - s/p pinning of femur  - PT/OT  - eliquis for DVT ppx   - SS consulted for assistance with placement   7/18  - SWB pending   - eliquis   - PT/OT

## 2022-07-18 NOTE — DISCHARGE SUMMARY
[address] Medicine  Discharge Summary      Patient Name: Anibal Fermin  MRN: [id number]  Patient Class: IP- Inpatient  Admission Date: 7/15/2022  Hospital Length of Stay: 3 days  Discharge Date and Time:  07/18/2022 2:50 PM  Attending Physician: Rizwan Orantes MD   Discharging Provider: QUAN Mascorro  Primary Care Provider: Brandan Brandon DO (Inactive)      HPI:   Ms Fermin is an 83 yo female who presented to the ED with c/o R leg/hip pain that started after a fall a week ago. She was seen at an outside clinic and had imaging done that did not show a fracture. She was sent home. She has continued to have problems walking and continued pain. Imaging in the ED today revealed a comminuted impacted fracture of the right-sided femoral neck. She is being admitted to the hospitalist service with consult to orthopedics.     She has a PMH of hypothyroidism, osteoarthritis, neuropathy, and COPD. She denies any ETOH or smoking. She lives at home. She has not been vaccinated for covid; she has not had covid. She is considered a full code.       Procedure(s) (LRB):  PINNING, HIP, PERCUTANEOUS (Right)      Hospital Course:   7/16: plans for surgical procedure today; will have PT/OT see and SS for d/c planning   7/17: s/p pinning of femur; PT/OT following; SS consulted; eliquis for dvt ppx   7/18: PT/OT; SWB pending; eliquis ongoing     Mrs. Fermin will be discharged home today after presenting to the ED with complaints of right leg/hip pain that was ongoing after a fall a week prior. She was noted to have a comminuted impacted fracture of the right-sided femoral neck. Orthopedics was consulted and she went to the OR for repair on 7/16/22. PT/OT was consulted and recommended SWB for d/c and patient has been accepted [facility] for [facility] services today. She will continue PO Eliquis x 14 days for DVT ppx and will restart ASA 81 mg after Eliquis dosing is complete. Written  instructions for discharge after hip fracture and how to prevent surgical site infections was given to patient prior to discharge. Discussed importance of medication compliance and follow ups with patient and she verbalized understanding.        Goals of Care Treatment Preferences:  Code Status: Full Code      Consults:   Consults (From admission, onward)        Status Ordering Provider     Inpatient consult to Social Work  Once        Provider:  (Not yet assigned)    Completed ANN ZHU     IP consult case management/social work  Once        Provider:  (Not yet assigned)    Completed LISA REVELES     Inpatient consult to Social Work  Once        Provider:  (Not yet assigned)    Completed KULDIP YIP     Inpatient consult to Orthopedic Surgery  Once        Provider:  Lisa Reveles MD    Acknowledged COLEEN SANDHU  S          * Fracture of femur  - acute  - comminuted impacted fracture of the right-sided femoral neck  - Dimitry consulted   - NPO p MN   - D5NS IVF  - EKG and CXR without acute processes   - continue home med- holding ASA  7/16  - OR today   7/17  - s/p pinning of femur  - PT/OT  - eliquis for DVT ppx   - SS consulted for assistance with placement   7/18  - [facility]  - eliquis   - PT/OT       Neuropathy  - chronic, stable  - continue gabapentin       Hypothyroidism  - chronic, stable  - continue levothyroxine       GERD (gastroesophageal reflux disease)  - chronic, stable  - continue PPI         Final Active Diagnoses:    Diagnosis Date Noted POA    PRINCIPAL PROBLEM:  Fracture of femur [S72.90XA] 07/15/2022 Yes    GERD (gastroesophageal reflux disease) [K21.9] 07/15/2022 Yes    Hypothyroidism [E03.9] 07/15/2022 Yes    Neuropathy [G62.9] 07/15/2022 Yes    Osteoarthritis [M19.90] 07/15/2022 Yes      Problems Resolved During this Admission:       Discharged Condition: good    Disposition: Rehab Facility ([facility])    Follow Up:   Follow-up Information     Lisa  MAI Moraes MD Follow up in 3 week(s).    Specialty: Orthopedic Surgery  Why: For wound re-check  Contact information:  [address]  [address]  [facility], [facility]opedic [facility], [facility] [place] [address]  [phone]             Brandan Brandon DO. Schedule an appointment as soon as possible for a visit in 1 week(s).    Specialty: Family Medicine  Why: Hospital d/c f/u  Contact information:  [address]  [place] [address]  [phone]                       Patient Instructions:      Diet Adult Regular     Notify your health care provider if you experience any of the following:  difficulty breathing or increased cough     Notify your health care provider if you experience any of the following:  persistent dizziness, light-headedness, or visual disturbances     Notify your health care provider if you experience any of the following:  increased confusion or weakness     Notify your health care provider if you experience any of the following:  redness, tenderness, or signs of infection (pain, swelling, redness, odor or green/yellow discharge around incision site)     Notify your health care provider if you experience any of the following:  severe uncontrolled pain     Notify your health care provider if you experience any of the following:  temperature >100.4     Notify your health care provider if you experience any of the following:  persistent nausea and vomiting or diarrhea       Significant Diagnostic Studies: Labs:   BMP:   Recent Labs   Lab 07/17/22  0333 07/18/22  0321   * 109*    140   K 4.2 4.4    109*   CO2 27 26   BUN 17 26*   CREATININE 0.75 0.66   CALCIUM 8.2* 8.0*   MG 1.9 1.8   , CBC   Recent Labs   Lab 07/17/22  0333 07/18/22  0321   WBC 7.06 5.23   HGB 10.6* 9.5*   HCT 32.2* 29.0*    277    and All labs within the past 24 hours have been reviewed    XR HIP WITH PELVIS WHEN PERFORMED, 2 OR 3  VIEWS RIGHT     CLINICAL HISTORY:   Pinning right hip;      TECHNIQUE:   XR HIP WITH PELVIS WHEN PERFORMED, 2 OR 3  VIEWS RIGHT     COMPARISON:   07/15/2022     FINDINGS:   Anatomic alignment of the right femoral neck fracture.     Hardware in expected position.     No radiopaque foreign bodies.   Impression:    Anatomic alignment of the right femoral neck fracture.     Hardware in expected position.       XR CHEST 1 VIEW   CLINICAL HISTORY:   Encounter for other preprocedural examination     COMPARISON:   29 May 2015     FINDINGS:   The heart and mediastinum are normal in size and configuration.  The pulmonary vascularity is normal in caliber. Lung volumes are increased with prominent bronchial markings.  No lung infiltrates, effusions, pneumothorax or other abnormality is demonstrated.   Impression:    Chronic lung changes.  No acute process or significant change.       CT HIP WITHOUT CONTRAST RIGHT   CLINICAL HISTORY:   hip pain;     TECHNIQUE:   CT HIP WITHOUT CONTRAST RIGHT     COMPARISON:   07/11/2022     FINDINGS:   Acute mildly comminuted impacted fracture of the right-sided femoral neck.  No joint dislocation.     No hematoma.  No fluid collection.     The visualized pelvis is normal without hematoma or retroperitoneal blood products.   Impression:    Acute mildly comminuted impacted fracture of the right-sided femoral neck. No joint dislocation.      Medications:  Reconciled Home Medications:      Medication List      START taking these medications    apixaban 2.5 mg Tab  Commonly known as: ELIQUIS  Take 1 tablet (2.5 mg total) by mouth 2 (two) times daily. for 14 days     HYDROcodone-acetaminophen 5-325 mg per tablet  Commonly known as: NORCO  Take 1 tablet by mouth every 6 (six) hours as needed for Pain.        CHANGE how you take these medications    gabapentin 300 MG capsule  Commonly known as: NEURONTIN  Take 1 capsule (300 mg total) by mouth 2 (two) times daily.  What changed: when to take this     levothyroxine 50 MCG tablet  Commonly known as:  SYNTHROID  Take 1 tablet (50 mcg total) by mouth before breakfast.  What changed: when to take this        CONTINUE taking these medications    acetaminophen 325 MG tablet  Commonly known as: TYLENOL  Take by mouth.     ATROVENT HFA 17 mcg/actuation inhaler  Generic drug: ipratropium  Inhale into the lungs.     calcium-vitamin D 600 mg-10 mcg (400 unit) Tab  Take 1 tablet by mouth daily as needed.     dicyclomine 10 MG capsule  Commonly known as: BENTYL     docusate sodium 100 MG capsule  Commonly known as: COLACE  Take by mouth.     multivitamin per tablet  Commonly known as: THERAGRAN  Take 1 tablet by mouth once daily.     omeprazole 20 MG capsule  Commonly known as: PRILOSEC  Take 20 mg by mouth.     promethazine 25 MG tablet  Commonly known as: PHENERGAN  Take by mouth.     ZANAFLEX 4 MG tablet  Generic drug: tiZANidine        STOP taking these medications    aspirin 81 MG EC tablet  Commonly known as: ECOTRIN     meloxicam 15 MG tablet  Commonly known as: MOBIC     perphenazine-amitriptyline 2-25 mg 2-25 mg Tab     VOLTAREN 1 % Gel  Generic drug: diclofenac sodium            Indwelling Lines/Drains at time of discharge:   Lines/Drains/Airways     None             Discussed/reviewed discharge plan/medications with /EDGARD Rider prior to patient's discharge to [facility].     Time spent on the discharge of patient: Greater than 30 minutes         QUAN Mascorro  Department of Hospital Medicine  [address]

## 2022-07-18 NOTE — PT/OT/SLP PROGRESS
Occupational Therapy   Treatment    Name: Anibal Fermin  MRN: [id number]  Admitting Diagnosis:  Fracture of femur  2 Days Post-Op    Recommendations:     Discharge Recommendations: nursing facility, skilled, rehabilitation facility  Discharge Equipment Recommendations:  walker, rolling, 3-in-1 commode, wheelchair  Barriers to discharge:  None    Assessment:     Anibal Fermin is a 82 y.o. female with a medical diagnosis of Fracture of femur.  She presents with alert and agreeable to OT treatment. Pt is also trying to manage other tasks unrelated to her current injury or hospital stay from the phone with her daughter's assistance. Performance deficits affecting function are weakness, impaired balance, impaired endurance, impaired functional mobility, impaired self care skills, pain, orthopedic precautions.     Rehab Prognosis:  Good; patient would benefit from acute skilled OT services to address these deficits and reach maximum level of function.       Plan:     Patient to be seen 5 x/week to address the above listed problems via self-care/home management, therapeutic activities, therapeutic exercises  · Plan of Care Expires: 08/09/22  · Plan of Care Reviewed with: patient, daughter    Subjective     Pain/Comfort:  · Pain Rating 1: 3/10  · Location - Side 1: Right  · Location 1: hip  · Pain Addressed 1: Pre-medicate for activity, Reposition, Distraction, Cessation of Activity  · Pain Rating Post-Intervention 1: 3/10    Objective:     Communicated with: JESSICA Carranza prior to session.  Patient found HOB elevated with peripheral IV, SCD, bed alarm upon OT entry to room.    General Precautions: Standard, fall   Orthopedic Precautions:RLE toe touch weight bearing   Braces: N/A  Respiratory Status: Room air     Occupational Performance:     Bed Mobility:    · Patient completed Supine to Sit with contact guard assistance     Functional Mobility/Transfers:  · Patient completed Sit <> Stand Transfer with stand by  assistance and verbal cues  with  rolling walker   · Patient completed Bed <> Chair Transfer using TTWB to (R) LE with cues- modified step transfer technique with contact guard assistance with rolling walker  · Functional Mobility: Pt demonstrated good adherence to TTWB to (R) LE with verbal cues for sequencing and safety.    Activities of Daily Living:  · Grooming: independence with access to supplies  · Bathing: Pt bathed from chair with setup for upper body anterior, back, and min(A) for LEs. Pt stood with SBA and performed perineum washing with setup and SBA.    · Upper Body Dressing: independence with hospital gown  · Lower Body Dressing: Pt used reacher to doff socks and sockaid to don socks with verbal cues and demonstration.        [facility] 6 Click ADL: 22    Treatment & Education:  Pt demonstrated good safety with functional mobility, good self- care skills. Pt does need swingbed at D/C as she lives alone and does not have assistance at home. Pt will be TTWB on (R) LE, which increases her risk for falls.    Patient left up in chair with all lines intact, JESSICA Arredondo notified and daughter presentEducation:      GOALS:   Multidisciplinary Problems     Occupational Therapy Goals        Problem: Occupational Therapy    Goal Priority Disciplines Outcome Interventions   Occupational Therapy Goal     OT, PT/OT Ongoing, Progressing    Description: STG:  Pt will perform grooming with setup  Pt will bathe with setup and CGA  Pt will perform UE dressing with setup  Pt will perform LE dressing with setup and CGA with AD if needed  Pt will sit EOB x 5 min with supervision assistance  Pt will transfer bed/chair/bsc with RW,  with CGA with TTWB to (R) LE  Pt will perform standing task x 4 min with CGA with RW, TTWB to (R) LE  Pt will tolerate 20 minutes of tx without fatigue      LT.Restore to max I with self care and mobility.                      Time Tracking:     OT Date of Treatment: 22  OT Start Time: 907  OT  Stop Time: 1012  OT Total Time (min): 65 min    Billable Minutes:Self Care/Home Management 47 min    OT/SHERMAN: OT          7/18/2022

## 2022-07-19 ENCOUNTER — TELEPHONE (OUTPATIENT)
Dept: ORTHOPEDICS | Facility: HOSPITAL | Age: 82
End: 2022-07-19
Payer: MEDICARE

## 2022-07-19 NOTE — TELEPHONE ENCOUNTER
[facility] spoke with her daughter. States everything is good, no questions no complaints.  Is your pain tolerable?       Has Home health therapy/outpatient therapy come to see you?       Are you taking your ecotrin/lovenox/eliquis?       Have you had a bowel movement since surgery?       Are you wearing your ERNAY hose?       Are you doing ankle pumps?      Are you doing your incentive spirometry?      Any complaints/concerns?

## 2022-08-10 ENCOUNTER — HOSPITAL ENCOUNTER (OUTPATIENT)
Dept: RADIOLOGY | Facility: HOSPITAL | Age: 82
Discharge: HOME | End: 2022-08-10
Attending: ORTHOPAEDIC SURGERY
Payer: MEDICARE

## 2022-08-10 DIAGNOSIS — S72.91XD CLOSED FRACTURE OF RIGHT FEMUR WITH ROUTINE HEALING, UNSPECIFIED FRACTURE MORPHOLOGY, UNSPECIFIED PORTION OF FEMUR, SUBSEQUENT ENCOUNTER: ICD-10-CM

## 2022-08-10 PROCEDURE — 73502 X-RAY EXAM HIP UNI 2-3 VIEWS: CPT | Mod: TC,RT

## 2022-09-12 ENCOUNTER — HOSPITAL ENCOUNTER (OUTPATIENT)
Dept: RADIOLOGY | Facility: HOSPITAL | Age: 82
Discharge: HOME | End: 2022-09-12
Attending: ORTHOPAEDIC SURGERY
Payer: MEDICARE

## 2022-09-12 DIAGNOSIS — Z09 SURGICAL FOLLOWUP: ICD-10-CM

## 2022-09-12 DIAGNOSIS — S72.91XD CLOSED FRACTURE OF RIGHT FEMUR WITH ROUTINE HEALING, UNSPECIFIED FRACTURE MORPHOLOGY, UNSPECIFIED PORTION OF FEMUR, SUBSEQUENT ENCOUNTER: ICD-10-CM

## 2022-09-12 PROCEDURE — 73502 X-RAY EXAM HIP UNI 2-3 VIEWS: CPT | Mod: TC,RT

## 2022-10-17 ENCOUNTER — HOSPITAL ENCOUNTER (OUTPATIENT)
Dept: RADIOLOGY | Facility: HOSPITAL | Age: 82
Discharge: HOME | End: 2022-10-17
Attending: ORTHOPAEDIC SURGERY
Payer: MEDICARE

## 2022-10-17 DIAGNOSIS — S72.91XD CLOSED FRACTURE OF RIGHT FEMUR WITH ROUTINE HEALING, UNSPECIFIED FRACTURE MORPHOLOGY, UNSPECIFIED PORTION OF FEMUR, SUBSEQUENT ENCOUNTER: ICD-10-CM

## 2022-10-17 PROCEDURE — 73502 X-RAY EXAM HIP UNI 2-3 VIEWS: CPT | Mod: TC,RT

## 2022-11-21 ENCOUNTER — HOSPITAL ENCOUNTER (OUTPATIENT)
Dept: RADIOLOGY | Facility: HOSPITAL | Age: 82
Discharge: HOME | End: 2022-11-21
Payer: MEDICARE

## 2022-11-21 DIAGNOSIS — Z12.31 VISIT FOR SCREENING MAMMOGRAM: ICD-10-CM

## 2022-11-21 PROCEDURE — 77067 SCR MAMMO BI INCL CAD: CPT | Mod: 26,,, | Performed by: RADIOLOGY

## 2022-11-21 PROCEDURE — 77067 MAMMO DIGITAL SCREENING BILAT: ICD-10-PCS | Mod: 26,,, | Performed by: RADIOLOGY

## 2022-11-21 PROCEDURE — 77067 SCR MAMMO BI INCL CAD: CPT | Mod: TC

## 2024-01-30 ENCOUNTER — HOSPITAL ENCOUNTER (OUTPATIENT)
Dept: RADIOLOGY | Facility: HOSPITAL | Age: 84
Discharge: HOME | End: 2024-01-30
Payer: MEDICARE

## 2024-01-30 DIAGNOSIS — Z12.31 VISIT FOR SCREENING MAMMOGRAM: ICD-10-CM

## 2024-01-30 PROCEDURE — 77067 SCR MAMMO BI INCL CAD: CPT | Mod: 26,,, | Performed by: RADIOLOGY

## 2024-01-30 PROCEDURE — 77063 BREAST TOMOSYNTHESIS BI: CPT | Mod: 26,,, | Performed by: RADIOLOGY

## 2024-01-30 PROCEDURE — 77067 SCR MAMMO BI INCL CAD: CPT | Mod: TC

## 2025-02-19 ENCOUNTER — HOSPITAL ENCOUNTER (OUTPATIENT)
Dept: RADIOLOGY | Facility: HOSPITAL | Age: 85
Discharge: HOME | End: 2025-02-19
Attending: RADIOLOGY
Payer: MEDICARE

## 2025-02-19 DIAGNOSIS — Z12.31 VISIT FOR SCREENING MAMMOGRAM: ICD-10-CM

## 2025-02-19 PROCEDURE — 77067 SCR MAMMO BI INCL CAD: CPT | Mod: TC

## (undated) DEVICE — SOL IRRIGATION SALINE 0.9% 1000ML BOTTLE

## (undated) DEVICE — STAPLER SKIN PROXIMATE PLUS MD 35 REG DISP

## (undated) DEVICE — IMPLANTABLE DEVICE
Type: IMPLANTABLE DEVICE | Site: HIP | Status: NON-FUNCTIONAL
Removed: 2022-07-16

## (undated) DEVICE — GLOVE SURGICAL PROTEXIS PI CLASSIC SIZE 8.0

## (undated) DEVICE — GLOVE SURGICAL PROTEXIS PI CLASSIC SIZE 7.5

## (undated) DEVICE — SPONGE GAUZE 4X4 12 PLY STL AMD 10/TRAY

## (undated) DEVICE — SUTURE VICRYL 2-0 CT-1 27"

## (undated) DEVICE — APPLICATOR CHLORAPREP HI-LITE TINTED ORANGE 26ML

## (undated) DEVICE — CDS TABLE FRACTURE

## (undated) DEVICE — GLOVE SURGICAL PROTEXIS PI CLASSIC SIZE 8.5

## (undated) DEVICE — GOWN SURGICAL STERILE LEVEL 3 / XX-LARGE